# Patient Record
Sex: FEMALE | Race: WHITE | NOT HISPANIC OR LATINO | Employment: OTHER | URBAN - NONMETROPOLITAN AREA
[De-identification: names, ages, dates, MRNs, and addresses within clinical notes are randomized per-mention and may not be internally consistent; named-entity substitution may affect disease eponyms.]

---

## 2017-01-03 ENCOUNTER — TRANSCRIBE ORDERS (OUTPATIENT)
Dept: ADMINISTRATIVE | Facility: HOSPITAL | Age: 72
End: 2017-01-03

## 2017-01-03 DIAGNOSIS — R52 PAIN: Primary | ICD-10-CM

## 2017-01-05 ENCOUNTER — TRANSCRIBE ORDERS (OUTPATIENT)
Dept: ADMINISTRATIVE | Facility: HOSPITAL | Age: 72
End: 2017-01-05

## 2017-01-05 ENCOUNTER — HOSPITAL ENCOUNTER (OUTPATIENT)
Dept: ULTRASOUND IMAGING | Facility: HOSPITAL | Age: 72
Discharge: HOME | End: 2017-01-05
Admitting: INTERNAL MEDICINE

## 2017-01-05 DIAGNOSIS — I73.9 CLAUDICATION OF BOTH LOWER EXTREMITIES (HCC): Primary | ICD-10-CM

## 2017-01-05 DIAGNOSIS — I73.9 CLAUDICATION OF BOTH LOWER EXTREMITIES (HCC): ICD-10-CM

## 2017-01-05 DIAGNOSIS — R52 PAIN: ICD-10-CM

## 2017-01-05 PROCEDURE — 93922 UPR/L XTREMITY ART 2 LEVELS: CPT

## 2017-01-05 PROCEDURE — 93924 LWR XTR VASC STDY BILAT: CPT | Performed by: SURGERY

## 2017-06-08 ENCOUNTER — OFFICE VISIT (OUTPATIENT)
Dept: UROLOGY | Facility: CLINIC | Age: 72
End: 2017-06-08

## 2017-06-08 ENCOUNTER — HOSPITAL ENCOUNTER (OUTPATIENT)
Dept: GENERAL RADIOLOGY | Facility: HOSPITAL | Age: 72
Discharge: HOME | End: 2017-06-08
Attending: UROLOGY | Admitting: UROLOGY

## 2017-06-08 VITALS
TEMPERATURE: 98.5 F | BODY MASS INDEX: 21.6 KG/M2 | DIASTOLIC BLOOD PRESSURE: 64 MMHG | SYSTOLIC BLOOD PRESSURE: 118 MMHG | WEIGHT: 137.6 LBS | HEIGHT: 67 IN

## 2017-06-08 DIAGNOSIS — N28.89: Primary | ICD-10-CM

## 2017-06-08 DIAGNOSIS — R31.29 HEMATURIA, MICROSCOPIC: ICD-10-CM

## 2017-06-08 DIAGNOSIS — N20.0 KIDNEY STONE: ICD-10-CM

## 2017-06-08 DIAGNOSIS — N20.0 KIDNEY STONES: Primary | ICD-10-CM

## 2017-06-08 LAB
BILIRUB BLD-MCNC: NEGATIVE MG/DL
CLARITY, POC: CLEAR
COLOR UR: YELLOW
GLUCOSE UR STRIP-MCNC: NEGATIVE MG/DL
KETONES UR QL: NEGATIVE
LEUKOCYTE EST, POC: NEGATIVE
NITRITE UR-MCNC: NEGATIVE MG/ML
PH UR: 7 [PH] (ref 5–8)
PROT UR STRIP-MCNC: NEGATIVE MG/DL
RBC # UR STRIP: ABNORMAL /UL
SP GR UR: 1.01 (ref 1–1.03)
UROBILINOGEN UR QL: NORMAL

## 2017-06-08 PROCEDURE — 88112 CYTOPATH CELL ENHANCE TECH: CPT | Performed by: UROLOGY

## 2017-06-08 PROCEDURE — 81003 URINALYSIS AUTO W/O SCOPE: CPT | Performed by: UROLOGY

## 2017-06-08 PROCEDURE — 74000 HC ABDOMEN KUB: CPT

## 2017-06-08 PROCEDURE — 99213 OFFICE O/P EST LOW 20 MIN: CPT | Performed by: UROLOGY

## 2017-06-08 RX ORDER — HYDROCODONE BITARTRATE AND ACETAMINOPHEN 10; 325 MG/1; MG/1
TABLET ORAL
Refills: 0 | COMMUNITY
Start: 2017-06-03

## 2017-06-08 RX ORDER — IBUPROFEN 800 MG/1
TABLET ORAL
COMMUNITY
Start: 2017-03-16 | End: 2019-12-22

## 2017-06-08 RX ORDER — DIAZEPAM 5 MG/1
TABLET ORAL
Refills: 2 | COMMUNITY
Start: 2017-06-03

## 2017-06-08 RX ORDER — CITALOPRAM 20 MG/1
TABLET ORAL
COMMUNITY
Start: 2017-06-05

## 2017-06-08 RX ORDER — ESTRADIOL 1 MG/1
TABLET ORAL
COMMUNITY
Start: 2017-05-19 | End: 2021-10-21

## 2017-06-08 RX ORDER — LOSARTAN POTASSIUM AND HYDROCHLOROTHIAZIDE 25; 100 MG/1; MG/1
TABLET ORAL
Refills: 3 | COMMUNITY
Start: 2017-06-03 | End: 2021-10-21

## 2017-06-08 RX ORDER — TIZANIDINE 4 MG/1
TABLET ORAL
Refills: 2 | COMMUNITY
Start: 2017-03-28

## 2017-06-08 RX ORDER — DULOXETIN HYDROCHLORIDE 30 MG/1
CAPSULE, DELAYED RELEASE ORAL
Refills: 0 | COMMUNITY
Start: 2017-03-03 | End: 2021-10-21

## 2017-06-08 NOTE — PROGRESS NOTES
Subjective    Ms. Guerrero is 71 y.o. female    Chief Complaint: Hematuria/Nephrolithiasis    History of Present Illness     Recurrent Nephrolithiasis  Patient is her for recurrent nephrolithiasis.  Location of stone is left renal. Stones were found for workup of flank pain.  Pt is currently Asymptomatic.  Pt. Has had stone disease for 2year(s). Risk factors for stone disease include none identified. Previous management of stone disease was observation.  Stone analysis was Unknown.  Metabolic workup revealed unknown.  Current treatment regimen includes hydration.  Associated symptoms include none.      Hematuria  Patient complains of microscopic hematuria. Onset of hematuria was 1 year ago and was gradual in onset. There is a history of nephrolithiasis. There is not a history of urologic trauma. Other urologic symptoms include none. Patient admits to history of no risk factors for cancer. Patient denies history of  trauma. Prior workup has been UA, cystoscopy, CT urogram. Prior workup has revealed no etiology.    The following portions of the patient's history were reviewed and updated as appropriate: allergies, current medications, past family history, past medical history, past social history, past surgical history and problem list.    Review of Systems   Constitutional: Negative for chills and fever.   Gastrointestinal: Negative for abdominal pain, anal bleeding and blood in stool.   Genitourinary: Negative for flank pain and hematuria.         Current Outpatient Prescriptions:   •  BREO ELLIPTA 100-25 MCG/INH aerosol powder , INHALE 1 PUFF PO QD, Disp: , Rfl: 2  •  citalopram (CeleXA) 20 MG tablet, , Disp: , Rfl:   •  diazePAM (VALIUM) 5 MG tablet, TK 1 T PO BID, Disp: , Rfl: 2  •  DULoxetine (CYMBALTA) 30 MG capsule, TK 1 C PO QD, Disp: , Rfl: 0  •  estradiol (ESTRACE) 1 MG tablet, , Disp: , Rfl:   •  HYDROcodone-acetaminophen (NORCO)  MG per tablet, TK 1 T PO TID, Disp: , Rfl: 0  •  ibuprofen  "(ADVIL,MOTRIN) 800 MG tablet, , Disp: , Rfl:   •  losartan-hydrochlorothiazide (HYZAAR) 100-25 MG per tablet, TK 1 T PO QD, Disp: , Rfl: 3  •  tiZANidine (ZANAFLEX) 4 MG tablet, TK 1 T PO BID, Disp: , Rfl: 2    Past Medical History:   Diagnosis Date   • Depression    • Hyperlipidemia    • Hypertension    • Kidney stone        Past Surgical History:   Procedure Laterality Date   • BLADDER SURGERY     • CHOLECYSTECTOMY     • HYSTERECTOMY         Social History     Social History   • Marital status:      Spouse name: N/A   • Number of children: N/A   • Years of education: N/A     Social History Main Topics   • Smoking status: Never Smoker   • Smokeless tobacco: None   • Alcohol use No   • Drug use: None   • Sexual activity: Not Asked     Other Topics Concern   • None     Social History Narrative   • None       Family History   Problem Relation Age of Onset   • No Known Problems Father    • No Known Problems Mother        Objective    /64  Temp 98.5 °F (36.9 °C)  Ht 67\" (170.2 cm)  Wt 137 lb 9.6 oz (62.4 kg)  BMI 21.55 kg/m2    Physical Exam   Constitutional: She is oriented to person, place, and time. She appears well-developed and well-nourished. No distress.   Pulmonary/Chest: Effort normal.   Abdominal: Soft. She exhibits no distension and no mass. There is no tenderness. There is no rebound and no guarding. No hernia.   Neurological: She is alert and oriented to person, place, and time.   Skin: Skin is warm and dry. She is not diaphoretic.   Psychiatric: She has a normal mood and affect.   Vitals reviewed.          Results for orders placed or performed in visit on 06/08/17   POC Urinalysis Dipstick, Automated   Result Value Ref Range    Color Yellow Yellow, Straw, Dark Yellow, Samira    Clarity, UA Clear Clear    Glucose, UA Negative Negative, 1000 mg/dL (3+) mg/dL    Bilirubin Negative Negative    Ketones, UA Negative Negative    Specific Gravity  1.015 1.005 - 1.030    Blood, UA Trace (A) " Negative    pH, Urine 7.0 5.0 - 8.0    Protein, POC Negative Negative mg/dL    Urobilinogen, UA Normal Normal    Leukocytes Negative Negative    Nitrite, UA Negative Negative   KUB independent review    A KUB is available for me to review today.  The image is inspected for a bowel gas pattern and the general bone structure of the spine and pelvis. The kidneys are then inspected closely.  Renal outline is noted if identifiable. The kidney, collecting system, and anticipated path of the ureter are examined for calcifications including those in the true pelvis.  This film reveals:    On the right there are no calcificaitons seen in the kidney or the expected course of the ureter. .    On the left there are no calcificaitons seen in the kidney or the expected course of the ureter. .      Assessment and Plan    Diagnoses and all orders for this visit:    Congenital calyceal diverticulum  -     POC Urinalysis Dipstick, Automated    Hematuria, microscopic  -     Non-gynecologic Cytology    Kidney stone    Patient has a negative hematuria workup in 2016.  She does have a calyceal diverticulum left there was a stone noted on in her diverticulum.  This is not visible today on KUB.

## 2017-06-12 LAB
CYTO UR: NORMAL
LAB AP CASE REPORT: NORMAL
Lab: NORMAL
PATH REPORT.FINAL DX SPEC: NORMAL
PATH REPORT.GROSS SPEC: NORMAL

## 2017-07-07 ENCOUNTER — HOSPITAL ENCOUNTER (OUTPATIENT)
Dept: CT IMAGING | Age: 72
Discharge: HOME | End: 2017-07-07
Payer: MEDICARE

## 2017-07-07 DIAGNOSIS — M54.16 LUMBAR RADICULOPATHY: ICD-10-CM

## 2017-07-07 PROCEDURE — 72131 CT LUMBAR SPINE W/O DYE: CPT

## 2018-06-08 ENCOUNTER — TELEPHONE (OUTPATIENT)
Dept: UROLOGY | Facility: CLINIC | Age: 73
End: 2018-06-08

## 2018-06-26 NOTE — PROGRESS NOTES
Subjective    Ms. Guerrero is 73 y.o. female    Chief Complaint: Microscopic Hematuria/Kidney Stones    History of Present Illness    Recurrent Nephrolithiasis  Patient is her for recurrent nephrolithiasis.  Location of stone is left renal. Stones were found for workup of flank pain.  Pt is currently Asymptomatic.  Pt. Has had stone disease for 2year(s). Risk factors for stone disease include none identified. Previous management of stone disease was observation.  Stone analysis was Unknown.  Metabolic workup revealed unknown.  Current treatment regimen includes hydration.  Associated symptoms include none.       Hematuria  Patient complains of microscopic hematuria. Onset of hematuria was 1 year ago and was gradual in onset. There is a history of nephrolithiasis. There is not a history of urologic trauma. Other urologic symptoms include none. Patient admits to history of no risk factors for cancer. Patient denies history of  trauma. Prior workup has been UA, cystoscopy, CT urogram. Prior workup has revealed no etiology.    The following portions of the patient's history were reviewed and updated as appropriate: allergies, current medications, past family history, past medical history, past social history, past surgical history and problem list.    Review of Systems   Constitutional: Negative for chills and fever.   Gastrointestinal: Negative for abdominal pain, anal bleeding and blood in stool.   Genitourinary: Positive for flank pain (Bilateral L>R) and frequency. Negative for difficulty urinating, hematuria and urgency.         Current Outpatient Prescriptions:   •  BREO ELLIPTA 100-25 MCG/INH aerosol powder , INHALE 1 PUFF PO QD, Disp: , Rfl: 2  •  citalopram (CeleXA) 20 MG tablet, , Disp: , Rfl:   •  diazePAM (VALIUM) 5 MG tablet, TK 1 T PO BID, Disp: , Rfl: 2  •  DULoxetine (CYMBALTA) 30 MG capsule, TK 1 C PO QD, Disp: , Rfl: 0  •  estradiol (ESTRACE) 1 MG tablet, , Disp: , Rfl:   •  HYDROcodone-acetaminophen  "(NORCO)  MG per tablet, TK 1 T PO TID, Disp: , Rfl: 0  •  ibuprofen (ADVIL,MOTRIN) 800 MG tablet, , Disp: , Rfl:   •  losartan-hydrochlorothiazide (HYZAAR) 100-25 MG per tablet, TK 1 T PO QD, Disp: , Rfl: 3  •  potassium chloride (K-DUR) 10 MEQ CR tablet, Take 10 mEq by mouth Daily., Disp: , Rfl: 2  •  rosuvastatin (CRESTOR) 20 MG tablet, Take  by mouth Daily., Disp: , Rfl: 2  •  tiZANidine (ZANAFLEX) 4 MG tablet, TK 1 T PO BID, Disp: , Rfl: 2    Past Medical History:   Diagnosis Date   • Depression    • Hyperlipidemia    • Hypertension    • Kidney stone        Past Surgical History:   Procedure Laterality Date   • BLADDER SURGERY     • CHOLECYSTECTOMY     • HYSTERECTOMY     • INCONTINENCE SURGERY      x2  Dr Lindsay       Social History     Social History   • Marital status:      Social History Main Topics   • Smoking status: Never Smoker   • Smokeless tobacco: Never Used   • Alcohol use No   • Drug use: No   • Sexual activity: Defer     Other Topics Concern   • Not on file       Family History   Problem Relation Age of Onset   • No Known Problems Father    • No Known Problems Mother        Objective    Ht 170.2 cm (67\")   Wt 61.4 kg (135 lb 6.4 oz)   BMI 21.21 kg/m²     Physical Exam   Constitutional: She is oriented to person, place, and time. She appears well-developed and well-nourished. No distress.   Pulmonary/Chest: Effort normal.   Abdominal: Soft. She exhibits no distension and no mass. There is no tenderness. There is no rebound and no guarding. No hernia.   Neurological: She is alert and oriented to person, place, and time.   Skin: Skin is warm and dry. She is not diaphoretic.   Psychiatric: She has a normal mood and affect.   Vitals reviewed.      KUB independent review    A KUB is available for me to review today.  The image is inspected for a bowel gas pattern and the general bone structure of the spine and pelvis. The kidneys are then inspected closely.  Renal outline is noted if " identifiable. The kidney, collecting system, and anticipated path of the ureter are examined for calcifications including those in the true pelvis.  This film reveals:    On the right there are no calcificaitons seen in the kidney or the expected course of the ureter. .    On the left there are no calcificaitons seen in the kidney or the expected course of the ureter. .        Results for orders placed or performed in visit on 06/27/18   POC Urinalysis Dipstick, Multipro   Result Value Ref Range    Color Yellow Yellow, Straw, Dark Yellow, Samira    Clarity, UA Clear Clear    Glucose, UA Negative Negative, 1000 mg/dL (3+) mg/dL    Bilirubin Negative Negative    Ketones, UA Negative Negative    Specific Gravity  1.030 1.005 - 1.030    Blood, UA Negative Negative    pH, Urine 6.5 5.0 - 8.0    Protein, POC Trace (A) Negative mg/dL    Urobilinogen, UA Normal Normal    Nitrite, UA Negative Negative    Leukocytes Negative Negative     Assessment and Plan    Diagnoses and all orders for this visit:    Kidney stones  -     XR Abdomen KUB    Hematuria, microscopic  -     POC Urinalysis Dipstick, Multipro          Patient has a negative hematuria workup in 2016.  She does have a calyceal diverticulum left there was a stone noted on in her diverticulum.  This is not visible today on KUB.  Her urine today is negative.  She will follow-up on a when necessary basis.

## 2018-06-27 ENCOUNTER — HOSPITAL ENCOUNTER (OUTPATIENT)
Dept: GENERAL RADIOLOGY | Facility: HOSPITAL | Age: 73
Discharge: HOME | End: 2018-06-27
Attending: UROLOGY | Admitting: UROLOGY

## 2018-06-27 ENCOUNTER — OFFICE VISIT (OUTPATIENT)
Dept: UROLOGY | Facility: CLINIC | Age: 73
End: 2018-06-27

## 2018-06-27 VITALS — WEIGHT: 135.4 LBS | BODY MASS INDEX: 21.25 KG/M2 | HEIGHT: 67 IN

## 2018-06-27 DIAGNOSIS — R31.29 HEMATURIA, MICROSCOPIC: ICD-10-CM

## 2018-06-27 DIAGNOSIS — N20.0 KIDNEY STONES: Primary | ICD-10-CM

## 2018-06-27 LAB
BILIRUB BLD-MCNC: NEGATIVE MG/DL
CLARITY, POC: CLEAR
COLOR UR: YELLOW
GLUCOSE UR STRIP-MCNC: NEGATIVE MG/DL
KETONES UR QL: NEGATIVE
LEUKOCYTE EST, POC: NEGATIVE
NITRITE UR-MCNC: NEGATIVE MG/ML
PH UR: 6.5 [PH] (ref 5–8)
PROT UR STRIP-MCNC: ABNORMAL MG/DL
RBC # UR STRIP: NEGATIVE /UL
SP GR UR: 1.03 (ref 1–1.03)
UROBILINOGEN UR QL: NORMAL

## 2018-06-27 PROCEDURE — 81001 URINALYSIS AUTO W/SCOPE: CPT | Performed by: UROLOGY

## 2018-06-27 PROCEDURE — 74018 RADEX ABDOMEN 1 VIEW: CPT

## 2018-06-27 PROCEDURE — 99213 OFFICE O/P EST LOW 20 MIN: CPT | Performed by: UROLOGY

## 2018-06-27 RX ORDER — POTASSIUM CHLORIDE 750 MG/1
10 TABLET, FILM COATED, EXTENDED RELEASE ORAL DAILY
Refills: 2 | COMMUNITY
Start: 2018-03-25 | End: 2021-10-21

## 2018-06-27 RX ORDER — ROSUVASTATIN CALCIUM 20 MG/1
TABLET, COATED ORAL DAILY
Refills: 2 | COMMUNITY
Start: 2018-03-25

## 2019-12-22 ENCOUNTER — APPOINTMENT (OUTPATIENT)
Dept: GENERAL RADIOLOGY | Facility: HOSPITAL | Age: 74
End: 2019-12-22

## 2019-12-22 ENCOUNTER — HOSPITAL ENCOUNTER (EMERGENCY)
Facility: HOSPITAL | Age: 74
Discharge: HOME | End: 2019-12-22
Admitting: EMERGENCY MEDICINE

## 2019-12-22 VITALS
OXYGEN SATURATION: 99 % | HEART RATE: 70 BPM | RESPIRATION RATE: 18 BRPM | SYSTOLIC BLOOD PRESSURE: 152 MMHG | DIASTOLIC BLOOD PRESSURE: 70 MMHG | HEIGHT: 67 IN | TEMPERATURE: 98 F | BODY MASS INDEX: 21.19 KG/M2 | WEIGHT: 135 LBS

## 2019-12-22 DIAGNOSIS — S93.401A SPRAIN OF RIGHT ANKLE, UNSPECIFIED LIGAMENT, INITIAL ENCOUNTER: Primary | ICD-10-CM

## 2019-12-22 DIAGNOSIS — M25.562 ACUTE PAIN OF LEFT KNEE: ICD-10-CM

## 2019-12-22 PROCEDURE — 73562 X-RAY EXAM OF KNEE 3: CPT

## 2019-12-22 PROCEDURE — 99283 EMERGENCY DEPT VISIT LOW MDM: CPT

## 2019-12-22 PROCEDURE — 73610 X-RAY EXAM OF ANKLE: CPT

## 2019-12-22 RX ORDER — HYDROCODONE BITARTRATE AND ACETAMINOPHEN 7.5; 325 MG/1; MG/1
1 TABLET ORAL ONCE
Status: COMPLETED | OUTPATIENT
Start: 2019-12-22 | End: 2019-12-22

## 2019-12-22 RX ORDER — NAPROXEN 500 MG/1
500 TABLET ORAL 2 TIMES DAILY WITH MEALS
Qty: 20 TABLET | Refills: 0 | Status: SHIPPED | OUTPATIENT
Start: 2019-12-22 | End: 2020-01-01

## 2019-12-22 RX ADMIN — HYDROCODONE BITARTRATE AND ACETAMINOPHEN 1 TABLET: 7.5; 325 TABLET ORAL at 20:33

## 2019-12-23 NOTE — DISCHARGE INSTRUCTIONS
Increase fluids.  Medication as ordered.  Rest, Ice, Elevate.  Walking boot when up.  Follow up with PCP tomorrow - call for appointment. Return to ED if condition does not improve or worsens

## 2019-12-23 NOTE — ED PROVIDER NOTES
Subjective   74 yof c/o right ankle and left knee pain.  She states she stepped on the board and it broke causing her to fall. She denies head injury or LOC.  She states she 'believes her knee is okay but her ankle hurts.'          Review of Systems   Constitutional: Negative for activity change, appetite change, fatigue and fever.   HENT: Negative for congestion, ear pain, facial swelling and sore throat.    Eyes: Negative for discharge and visual disturbance.   Respiratory: Negative for apnea, chest tightness, shortness of breath, wheezing and stridor.    Cardiovascular: Negative for chest pain and palpitations.   Gastrointestinal: Negative for abdominal distention, abdominal pain, diarrhea, nausea and vomiting.   Genitourinary: Negative for difficulty urinating and dysuria.   Musculoskeletal: Negative for arthralgias and myalgias.   Skin: Negative for rash and wound.   Neurological: Negative for dizziness and seizures.   Psychiatric/Behavioral: Negative for agitation and confusion.       Past Medical History:   Diagnosis Date   • Depression    • Hyperlipidemia    • Hypertension    • Kidney stone        No Known Allergies    Past Surgical History:   Procedure Laterality Date   • BLADDER SURGERY     • CHOLECYSTECTOMY     • HYSTERECTOMY     • INCONTINENCE SURGERY      x2  Dr Lindsay       Family History   Problem Relation Age of Onset   • No Known Problems Father    • No Known Problems Mother        Social History     Socioeconomic History   • Marital status:      Spouse name: Not on file   • Number of children: Not on file   • Years of education: Not on file   • Highest education level: Not on file   Tobacco Use   • Smoking status: Never Smoker   • Smokeless tobacco: Never Used   Substance and Sexual Activity   • Alcohol use: No   • Drug use: No   • Sexual activity: Defer           Objective   Physical Exam   Constitutional: She is oriented to person, place, and time. She appears well-developed.   HENT:    Head: Normocephalic.   Eyes: Pupils are equal, round, and reactive to light. EOM are normal.   Neck: Normal range of motion. Neck supple.   Cardiovascular: Normal rate and regular rhythm.   No murmur heard.  Pulmonary/Chest: Effort normal and breath sounds normal.   Abdominal: Soft. Bowel sounds are normal.   Musculoskeletal:        Left knee: She exhibits normal range of motion, no swelling, no effusion, no deformity, no laceration, no erythema and no bony tenderness. No tenderness found. No medial joint line, no lateral joint line, no MCL and no LCL tenderness noted.        Right ankle: She exhibits decreased range of motion, swelling and ecchymosis. She exhibits no deformity, no laceration and normal pulse. Tenderness. Lateral malleolus tenderness found. No medial malleolus tenderness found. Achilles tendon exhibits no pain, no defect and normal Allan's test results.        Neurological: She is alert and oriented to person, place, and time.   Skin: Skin is warm and dry.   Psychiatric: She has a normal mood and affect.       Splint - Cast - Strapping  Date/Time: 12/22/2019 8:45 PM  Performed by: Mariya Barry APRN  Authorized by: Mariya Barry APRN     Consent:     Consent obtained:  Verbal    Consent given by:  Patient    Risks discussed:  Discoloration, numbness, pain and swelling    Alternatives discussed:  No treatment  Pre-procedure details:     Sensation:  Normal    Skin color:  Pink  Procedure details:     Laterality:  Right    Location:  Ankle    Ankle:  R ankle    Strapping: no      Splint type:  CAM walker boot  Post-procedure details:     Pain:  Improved    Sensation:  Normal    Skin color:  Pink    Patient tolerance of procedure:  Tolerated well, no immediate complications  Comments:      Placed by PCT              No current facility-administered medications for this encounter.     Current Outpatient Medications:   •  BREO ELLIPTA 100-25 MCG/INH aerosol powder , INHALE 1  "PUFF PO QD, Disp: , Rfl: 2  •  citalopram (CeleXA) 20 MG tablet, , Disp: , Rfl:   •  diazePAM (VALIUM) 5 MG tablet, TK 1 T PO BID, Disp: , Rfl: 2  •  DULoxetine (CYMBALTA) 30 MG capsule, TK 1 C PO QD, Disp: , Rfl: 0  •  estradiol (ESTRACE) 1 MG tablet, , Disp: , Rfl:   •  HYDROcodone-acetaminophen (NORCO)  MG per tablet, TK 1 T PO TID, Disp: , Rfl: 0  •  losartan-hydrochlorothiazide (HYZAAR) 100-25 MG per tablet, TK 1 T PO QD, Disp: , Rfl: 3  •  naproxen (NAPROSYN) 500 MG tablet, Take 1 tablet by mouth 2 (Two) Times a Day With Meals for 10 days., Disp: 20 tablet, Rfl: 0  •  potassium chloride (K-DUR) 10 MEQ CR tablet, Take 10 mEq by mouth Daily., Disp: , Rfl: 2  •  rosuvastatin (CRESTOR) 20 MG tablet, Take  by mouth Daily., Disp: , Rfl: 2  •  tiZANidine (ZANAFLEX) 4 MG tablet, TK 1 T PO BID, Disp: , Rfl: 2    Vital signs:  /70 (BP Location: Right arm, Patient Position: Sitting)   Pulse 70   Temp 98 °F (36.7 °C) (Oral)   Resp 18   Ht 170.2 cm (67\")   Wt 61.2 kg (135 lb)   SpO2 99%   BMI 21.14 kg/m²        ED LAB RESULTS:   Lab Results (last 24 hours)     ** No results found for the last 24 hours. **             IMAGING RESULTS  XR Knee 3 View Left   ED Interpretation   See results below      Final Result   Addendum 1 of 1   Addendum:       XR KNEE 3 VW LEFT-        12/22/2019 7:20 PM CST       Dictation error:       The distal femur and the proximal tibia and fibula are intact.        Normal tibiofemoral and patellofemoral joint spaces.       No significant joint effusion.       Summary:   1. No acute bony abnormality.                       This report was finalized on 12/22/2019 20:03 by Dr. Surinder Lovell MD.      Final      XR Ankle 3+ View Right   ED Interpretation   See results below      Final Result                     ED Course  ED Course as of Dec 22 2131   Sun Dec 22, 2019   2055 Xray reviewed by Dr Shaw and he states it is chronic which is in agreement with radiology.    [KS]      ED " Course User Index  [KS] Mariya Barry, APRN                      No data recorded                        MDM  Number of Diagnoses or Management Options  Acute pain of left knee: minor  Sprain of right ankle, unspecified ligament, initial encounter: minor     Amount and/or Complexity of Data Reviewed  Tests in the radiology section of CPT®: ordered and reviewed  Discuss the patient with other providers: yes  Independent visualization of images, tracings, or specimens: yes    Risk of Complications, Morbidity, and/or Mortality  Presenting problems: minimal  Diagnostic procedures: minimal  Management options: minimal    Patient Progress  Patient progress: improved      Final diagnoses:   Sprain of right ankle, unspecified ligament, initial encounter   Acute pain of left knee              Praneeths, Mariya Estrada, APRN  12/22/19 [id number]

## 2020-06-04 ENCOUNTER — TRANSCRIBE ORDERS (OUTPATIENT)
Dept: ADMINISTRATIVE | Facility: HOSPITAL | Age: 75
End: 2020-06-04

## 2020-06-04 DIAGNOSIS — Z01.818 PREOP TESTING: Primary | ICD-10-CM

## 2020-06-12 ENCOUNTER — LAB (OUTPATIENT)
Dept: LAB | Facility: HOSPITAL | Age: 75
End: 2020-06-12

## 2020-06-12 PROCEDURE — U0003 INFECTIOUS AGENT DETECTION BY NUCLEIC ACID (DNA OR RNA); SEVERE ACUTE RESPIRATORY SYNDROME CORONAVIRUS 2 (SARS-COV-2) (CORONAVIRUS DISEASE [COVID-19]), AMPLIFIED PROBE TECHNIQUE, MAKING USE OF HIGH THROUGHPUT TECHNOLOGIES AS DESCRIBED BY CMS-2020-01-R: HCPCS | Performed by: PAIN MEDICINE

## 2020-06-13 LAB
COVID LABCORP PRIORITY: NORMAL
SARS-COV-2 RNA RESP QL NAA+PROBE: NOT DETECTED

## 2020-09-14 ENCOUNTER — TRANSCRIBE ORDERS (OUTPATIENT)
Dept: ADMINISTRATIVE | Facility: HOSPITAL | Age: 75
End: 2020-09-14

## 2020-09-14 ENCOUNTER — HOSPITAL ENCOUNTER (EMERGENCY)
Facility: HOSPITAL | Age: 75
End: 2020-09-14

## 2020-09-14 DIAGNOSIS — Z01.818 PREOP TESTING: Primary | ICD-10-CM

## 2020-09-15 ENCOUNTER — LAB (OUTPATIENT)
Dept: LAB | Facility: HOSPITAL | Age: 75
End: 2020-09-15

## 2020-09-15 PROCEDURE — U0003 INFECTIOUS AGENT DETECTION BY NUCLEIC ACID (DNA OR RNA); SEVERE ACUTE RESPIRATORY SYNDROME CORONAVIRUS 2 (SARS-COV-2) (CORONAVIRUS DISEASE [COVID-19]), AMPLIFIED PROBE TECHNIQUE, MAKING USE OF HIGH THROUGHPUT TECHNOLOGIES AS DESCRIBED BY CMS-2020-01-R: HCPCS | Performed by: PAIN MEDICINE

## 2020-09-15 PROCEDURE — C9803 HOPD COVID-19 SPEC COLLECT: HCPCS | Performed by: PAIN MEDICINE

## 2020-09-16 LAB
COVID LABCORP PRIORITY: NORMAL
SARS-COV-2 RNA RESP QL NAA+PROBE: NOT DETECTED

## 2020-12-16 ENCOUNTER — TRANSCRIBE ORDERS (OUTPATIENT)
Dept: ADMINISTRATIVE | Facility: HOSPITAL | Age: 75
End: 2020-12-16

## 2020-12-16 DIAGNOSIS — Z01.818 PREOP TESTING: Primary | ICD-10-CM

## 2020-12-19 ENCOUNTER — LAB (OUTPATIENT)
Dept: LAB | Facility: HOSPITAL | Age: 75
End: 2020-12-19

## 2020-12-19 PROCEDURE — U0003 INFECTIOUS AGENT DETECTION BY NUCLEIC ACID (DNA OR RNA); SEVERE ACUTE RESPIRATORY SYNDROME CORONAVIRUS 2 (SARS-COV-2) (CORONAVIRUS DISEASE [COVID-19]), AMPLIFIED PROBE TECHNIQUE, MAKING USE OF HIGH THROUGHPUT TECHNOLOGIES AS DESCRIBED BY CMS-2020-01-R: HCPCS | Performed by: PAIN MEDICINE

## 2020-12-19 PROCEDURE — C9803 HOPD COVID-19 SPEC COLLECT: HCPCS | Performed by: PAIN MEDICINE

## 2020-12-20 LAB
COVID LABCORP PRIORITY: NORMAL
SARS-COV-2 RNA RESP QL NAA+PROBE: NOT DETECTED

## 2021-03-18 ENCOUNTER — TRANSCRIBE ORDERS (OUTPATIENT)
Dept: LAB | Facility: HOSPITAL | Age: 76
End: 2021-03-18

## 2021-03-18 DIAGNOSIS — Z01.818 PREOP TESTING: Primary | ICD-10-CM

## 2021-03-20 ENCOUNTER — LAB (OUTPATIENT)
Dept: LAB | Facility: HOSPITAL | Age: 76
End: 2021-03-20

## 2021-03-20 LAB — SARS-COV-2 ORF1AB RESP QL NAA+PROBE: NOT DETECTED

## 2021-03-20 PROCEDURE — C9803 HOPD COVID-19 SPEC COLLECT: HCPCS | Performed by: PAIN MEDICINE

## 2021-03-20 PROCEDURE — U0004 COV-19 TEST NON-CDC HGH THRU: HCPCS | Performed by: PAIN MEDICINE

## 2021-03-20 PROCEDURE — U0005 INFEC AGEN DETEC AMPLI PROBE: HCPCS | Performed by: PAIN MEDICINE

## 2021-06-24 ENCOUNTER — TRANSCRIBE ORDERS (OUTPATIENT)
Dept: LAB | Facility: HOSPITAL | Age: 76
End: 2021-06-24

## 2021-10-20 ENCOUNTER — TELEPHONE (OUTPATIENT)
Dept: VASCULAR SURGERY | Facility: CLINIC | Age: 76
End: 2021-10-20

## 2021-10-20 NOTE — TELEPHONE ENCOUNTER
Spoke with Ms Guerrero reminding her of her appointment for Thursday, October 21st, 2021 at 845 am with Dr Moon. Ms Guerrero confirmed she would be here.

## 2021-10-21 ENCOUNTER — OFFICE VISIT (OUTPATIENT)
Dept: VASCULAR SURGERY | Facility: CLINIC | Age: 76
End: 2021-10-21

## 2021-10-21 ENCOUNTER — PATIENT ROUNDING (BHMG ONLY) (OUTPATIENT)
Dept: VASCULAR SURGERY | Facility: CLINIC | Age: 76
End: 2021-10-21

## 2021-10-21 VITALS
BODY MASS INDEX: 22.6 KG/M2 | HEART RATE: 56 BPM | OXYGEN SATURATION: 96 % | SYSTOLIC BLOOD PRESSURE: 130 MMHG | WEIGHT: 144 LBS | HEIGHT: 67 IN | DIASTOLIC BLOOD PRESSURE: 74 MMHG

## 2021-10-21 DIAGNOSIS — I10 PRIMARY HYPERTENSION: ICD-10-CM

## 2021-10-21 DIAGNOSIS — I77.9 VERTEBRAL ARTERY DISEASE (HCC): ICD-10-CM

## 2021-10-21 DIAGNOSIS — I65.23 BILATERAL CAROTID ARTERY STENOSIS: Primary | ICD-10-CM

## 2021-10-21 DIAGNOSIS — E78.5 HYPERLIPIDEMIA, UNSPECIFIED HYPERLIPIDEMIA TYPE: ICD-10-CM

## 2021-10-21 PROCEDURE — 99204 OFFICE O/P NEW MOD 45 MIN: CPT | Performed by: SURGERY

## 2021-10-21 RX ORDER — LOSARTAN POTASSIUM 50 MG/1
50 TABLET ORAL DAILY
COMMUNITY
End: 2022-01-03 | Stop reason: SDUPTHER

## 2021-10-21 RX ORDER — HYDROCHLOROTHIAZIDE 12.5 MG/1
12.5 CAPSULE, GELATIN COATED ORAL DAILY
COMMUNITY
End: 2022-08-29

## 2021-10-21 RX ORDER — MECLIZINE HYDROCHLORIDE 25 MG/1
25 TABLET ORAL 3 TIMES DAILY PRN
COMMUNITY

## 2021-10-21 RX ORDER — CLOPIDOGREL BISULFATE 75 MG/1
75 TABLET ORAL DAILY
COMMUNITY

## 2021-10-21 RX ORDER — PANTOPRAZOLE SODIUM 40 MG/1
40 TABLET, DELAYED RELEASE ORAL DAILY
COMMUNITY
End: 2023-03-28 | Stop reason: SDUPTHER

## 2021-10-21 RX ORDER — CARVEDILOL 3.12 MG/1
3.12 TABLET ORAL 2 TIMES DAILY WITH MEALS
COMMUNITY

## 2021-10-21 NOTE — PROGRESS NOTES
Subjective    Ms. Guerrero is 76 y.o. female    Chief Complaint: Urinary incontinence.    History of Present Illness  Patient is a 76-year-old female who is referred to us with a new problem of urinary incontinence.  This has occurred for the last few years.  She reports some urgency and urge incontinence during the day her urge incontinence episodes are infrequent at night she wakes up at least once a night when she is already leaked urine.  She is not on any urologic medications.  She does have previous history of kidney stones and also has history of renal cysts one of the cyst on ultrasound done in June 2021 revealed findings consistent with possible angiomyolipoma.  She has seen Dr. Shah last seen June 2018.      The following portions of the patient's history were reviewed and updated as appropriate: allergies, current medications, past family history, past medical history, past social history, past surgical history and problem list.    Review of Systems      Current Outpatient Medications:   •  BREO ELLIPTA 100-25 MCG/INH aerosol powder , INHALE 1 PUFF PO QD, Disp: , Rfl: 2  •  carvedilol (COREG) 3.125 MG tablet, Take 3.125 mg by mouth 2 (Two) Times a Day With Meals., Disp: , Rfl:   •  citalopram (CeleXA) 20 MG tablet, , Disp: , Rfl:   •  clopidogrel (PLAVIX) 75 MG tablet, Take 75 mg by mouth Daily., Disp: , Rfl:   •  diazePAM (VALIUM) 5 MG tablet, TK 1 T PO BID, Disp: , Rfl: 2  •  hydroCHLOROthiazide (MICROZIDE) 12.5 MG capsule, Take 12.5 mg by mouth Daily., Disp: , Rfl:   •  HYDROcodone-acetaminophen (NORCO)  MG per tablet, TK 1 T PO TID, Disp: , Rfl: 0  •  losartan (COZAAR) 50 MG tablet, Take 50 mg by mouth Daily., Disp: , Rfl:   •  meclizine (ANTIVERT) 25 MG tablet, Take 25 mg by mouth 3 (Three) Times a Day As Needed for Dizziness., Disp: , Rfl:   •  pantoprazole (PROTONIX) 40 MG EC tablet, Take 40 mg by mouth Daily., Disp: , Rfl:   •  rosuvastatin (CRESTOR) 20 MG tablet, Take  by mouth Daily., Disp:  ", Rfl: 2  •  tiZANidine (ZANAFLEX) 4 MG tablet, TK 1 T PO BID, Disp: , Rfl: 2  •  Mirabegron ER (Myrbetriq) 25 MG tablet sustained-release 24 hour 24 hr tablet, Take 1 tablet by mouth Daily for 28 days., Disp: 28 tablet, Rfl: 0    Past Medical History:   Diagnosis Date   • Arthritis    • Asthma    • Cancer (HCC)     Female    • Depression    • Hearing loss    • Heart attack (HCC)    • Heart disease    • High blood pressure    • Hyperlipidemia    • Hypertension    • Hypertension    • Kidney stone    • Stroke (HCC)        Past Surgical History:   Procedure Laterality Date   • BACK SURGERY      40 Years Ago x2    • BLADDER SURGERY     • CARDIAC SURGERY      3 years ago in [facility]    • CHOLECYSTECTOMY     • HYSTERECTOMY     • INCONTINENCE SURGERY      x2  Dr Lindsay   • NECK SURGERY      30 years ago [facility]        Social History     Socioeconomic History   • Marital status:    Tobacco Use   • Smoking status: Never Smoker   • Smokeless tobacco: Never Used   Vaping Use   • Vaping Use: Never used   Substance and Sexual Activity   • Alcohol use: No   • Drug use: No   • Sexual activity: Defer       Family History   Problem Relation Age of Onset   • COPD Father    • Lung disease Father    • Heart disease Father    • Heart disease Mother    • Cancer Mother    • Stroke Mother    • Heart disease Sister    • Breast cancer Sister    • Diabetes Brother    • COPD Son    • Lung cancer Son    • Cancer Daughter    • Hepatitis Daughter        Objective    Temp 98.7 °F (37.1 °C)   Ht 170.2 cm (67\")   Wt 66 kg (145 lb 9.6 oz)   BMI 22.80 kg/m²     Physical Exam  Vitals reviewed.   Constitutional:       Appearance: Normal appearance.   HENT:      Head: Normocephalic and atraumatic.      Right Ear: External ear normal.      Left Ear: External ear normal.      Nose: No congestion.   Pulmonary:      Effort: Pulmonary effort is normal.   Abdominal:      Palpations: Abdomen is soft.      Tenderness: There " is no right CVA tenderness or left CVA tenderness.   Skin:     General: Skin is warm and dry.   Neurological:      General: No focal deficit present.      Mental Status: She is alert and oriented to person, place, and time.   Psychiatric:         Mood and Affect: Mood normal.         Behavior: Behavior normal.             Results for orders placed or performed in visit on 10/25/21   POC Urinalysis Dipstick, Multipro    Specimen: Urine   Result Value Ref Range    Color Yellow Yellow, Straw, Dark Yellow, Samira    Clarity, UA Clear Clear    Glucose, UA Negative Negative, 1000 mg/dL (3+) mg/dL    Bilirubin Negative Negative    Ketones, UA Negative Negative    Specific Gravity  1.020 1.005 - 1.030    Blood, UA Negative Negative    pH, Urine 7.0 5.0 - 8.0    Protein, POC Negative Negative mg/dL    Urobilinogen, UA Normal Normal    Nitrite, UA Negative Negative    Leukocytes Negative Negative   Bladder Scan interpretation  Estimation of residual urine via abdominal ultrasound  Residual Urine: 0 ml  Indication: Incontinence  Position: Supine  Examination: Incremental scanning of the suprapubic area using 3 MHz transducer using copious amounts of acoustic gel.   Findings: An anechoic area was demonstrated which represented the bladder, with measurement of residual urine as noted. I inspected this myself. In that the residual urine was stable or insignificant, no treatment will be necessary at this time.     Assessment and Plan    Diagnoses and all orders for this visit:    1. Urinary incontinence, unspecified type (Primary)  -     POC Urinalysis Dipstick, Multipro  -     CT Abdomen Pelvis With & Without Contrast; Future    2. History of kidney stones  -     CT Abdomen Pelvis With & Without Contrast; Future    3. Left renal mass  -     CT Abdomen Pelvis With & Without Contrast; Future    Patient is a 76-year-old female with gradually worsening incontinence over the past few years she has never been on any urologic medications  in the past.  She will have some episodes of urge incontinence during the day but also having incontinence at nighttime.  Her bladder scan was 0 postvoid residual.  I will start her on samples of Myrbetriq 25 mg.  Also I may refer her for pelvic floor rehab at physical therapy but will wait until I see her back in 1 month.    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  Would like her to get a CT urogram which will evaluate her for drainage of ureters along with any abnormalities of the kidneys bladder or ureters.

## 2021-10-21 NOTE — PROGRESS NOTES
10/21/2021      Coleman Zapata MD  [address] [place] [address]    Felipa Guerrero  1945    Chief Complaint   Patient presents with   • Establish Care     Referred over by Dr Zapata for Occlusion and Stenosis of Unspecified Vertebral Arteries. Test [id number] in Media CTA of Neck at [facility]. Patient denies any stroke like symptoms.    • Non Smoker     Patient is a Non Smoker    • other     patient granddaughter states she has had mini strokes prior and when you can see her face and she does have some facial droop   • Med Management     Verified medications from list patient/granddaughter brought in        Dear Coleman Zapata MD    HPI  I had the pleasure of seeing your patient Felipa Guerrero in the office today.  Thank you kindly for this consultation.  As you recall, Felipa Guerrero is a 76 y.o.  female who you are currently following for routine health maintenance. She is maintained on Plavix and Crestor. Patient's family states she has been having mini strokes.  She has a slight left facial droop. She did have noninvasive testing performed at an outside facility, which I did review.  MRI does show chronic lacunar infarcts right and small vessel disease. CTA of the neck showed significant bilateral vertebral disease.     Past Medical History:   Diagnosis Date   • Arthritis    • Asthma    • Cancer (HCC)     Female    • Depression    • Hearing loss    • Heart attack (HCC)    • Heart disease    • High blood pressure    • Hyperlipidemia    • Hypertension    • Hypertension    • Kidney stone    • Stroke (HCC)        Past Surgical History:   Procedure Laterality Date   • BACK SURGERY      40 Years Ago x2    • BLADDER SURGERY     • CARDIAC SURGERY      3 years ago in [facility]    • CHOLECYSTECTOMY     • HYSTERECTOMY     • INCONTINENCE SURGERY      x2  Dr Lindsay   • NECK SURGERY      30 years ago yamil barber        Family History   Problem Relation Age of Onset   • COPD  Father    • Lung disease Father    • Heart disease Father    • Heart disease Mother    • Cancer Mother    • Stroke Mother    • Heart disease Sister    • Breast cancer Sister    • Diabetes Brother    • COPD Son    • Lung cancer Son    • Cancer Daughter    • Hepatitis Daughter        Social History     Socioeconomic History   • Marital status:    Tobacco Use   • Smoking status: Never Smoker   • Smokeless tobacco: Never Used   Substance and Sexual Activity   • Alcohol use: No   • Drug use: No   • Sexual activity: Defer       No Known Allergies      Current Outpatient Medications:   •  BREO ELLIPTA 100-25 MCG/INH aerosol powder , INHALE 1 PUFF PO QD, Disp: , Rfl: 2  •  carvedilol (COREG) 3.125 MG tablet, Take 3.125 mg by mouth 2 (Two) Times a Day With Meals., Disp: , Rfl:   •  citalopram (CeleXA) 20 MG tablet, , Disp: , Rfl:   •  clopidogrel (PLAVIX) 75 MG tablet, Take 75 mg by mouth Daily., Disp: , Rfl:   •  diazePAM (VALIUM) 5 MG tablet, TK 1 T PO BID, Disp: , Rfl: 2  •  hydroCHLOROthiazide (MICROZIDE) 12.5 MG capsule, Take 12.5 mg by mouth Daily., Disp: , Rfl:   •  HYDROcodone-acetaminophen (NORCO)  MG per tablet, TK 1 T PO TID, Disp: , Rfl: 0  •  losartan (COZAAR) 50 MG tablet, Take 50 mg by mouth Daily., Disp: , Rfl:   •  meclizine (ANTIVERT) 25 MG tablet, Take 25 mg by mouth 3 (Three) Times a Day As Needed for Dizziness., Disp: , Rfl:   •  pantoprazole (PROTONIX) 40 MG EC tablet, Take 40 mg by mouth Daily., Disp: , Rfl:   •  rosuvastatin (CRESTOR) 20 MG tablet, Take  by mouth Daily., Disp: , Rfl: 2  •  tiZANidine (ZANAFLEX) 4 MG tablet, TK 1 T PO BID, Disp: , Rfl: 2    Review of Systems   Constitutional: Negative.    HENT: Negative.    Eyes: Negative.    Respiratory: Negative.    Cardiovascular: Negative.    Gastrointestinal: Negative.    Endocrine: Negative.    Genitourinary: Negative.    Musculoskeletal: Negative.    Skin: Negative.    Allergic/Immunologic: Negative.    Neurological: Negative.       "   Memory issues   Hematological: Negative.    Psychiatric/Behavioral: Negative.    All other systems reviewed and are negative.    /74 (BP Location: Left arm, Patient Position: Sitting, Cuff Size: Adult)   Pulse 56   Ht 170.2 cm (67\")   Wt 65.3 kg (144 lb)   SpO2 96%   BMI 22.55 kg/m²     Physical Exam  Vitals and nursing note reviewed.   Constitutional:       Appearance: Normal appearance. She is well-developed and normal weight.   HENT:      Head: Normocephalic and atraumatic.   Eyes:      General: No scleral icterus.     Pupils: Pupils are equal, round, and reactive to light.   Neck:      Thyroid: No thyromegaly.      Vascular: No carotid bruit or JVD.   Cardiovascular:      Rate and Rhythm: Normal rate and regular rhythm.      Pulses:           Carotid pulses are 2+ on the right side and 2+ on the left side.       Femoral pulses are 2+ on the right side and 2+ on the left side.       Popliteal pulses are 2+ on the right side and 2+ on the left side.        Dorsalis pedis pulses are 2+ on the right side and 2+ on the left side.        Posterior tibial pulses are 2+ on the right side and 2+ on the left side.      Heart sounds: Normal heart sounds.   Pulmonary:      Effort: Pulmonary effort is normal.      Breath sounds: Normal breath sounds.   Abdominal:      General: Bowel sounds are normal. There is no distension or abdominal bruit.      Palpations: Abdomen is soft. There is no mass.      Tenderness: There is no abdominal tenderness.   Musculoskeletal:         General: Normal range of motion.      Cervical back: Neck supple.   Lymphadenopathy:      Cervical: No cervical adenopathy.   Skin:     General: Skin is warm and dry.   Neurological:      Mental Status: She is alert and oriented to person, place, and time.      Cranial Nerves: No cranial nerve deficit.      Sensory: No sensory deficit.   Psychiatric:         Mood and Affect: Mood normal.         Behavior: Behavior normal.         Thought " Content: Thought content normal.         Judgment: Judgment normal.                 Patient Active Problem List   Diagnosis   • Hypertension   • Hyperlipidemia   • Stroke (HCC)        Diagnosis Plan   1. Bilateral carotid artery stenosis  US Carotid Bilateral   2. Primary hypertension     3. Hyperlipidemia, unspecified hyperlipidemia type     4. Vertebral artery disease (HCC)         Plan: After thoroughly evaluating Felipa Guerrero, I believe the best course of action is to remain conservative from a vascular surgery standpoint.  I carefully reviewed her CTA which does show vertebral artery disease at both takeoffs.  This does not require surgical intervention at this time.  Her strokes were caused by small vessel disease in the brain.  We did have a lengthy discussion about minimizing/controlling her risk factors that contribute to vascular disease.  I will see her back in 1 years time with a repeat carotid duplex for continued surveillance. I did discuss vascular risk factors as they pertain to the progression of vascular disease including controlling hypertension and hyperlipidemia.  These risk factors are currently controlled and stable.  The patient is to continue taking their medications as previously discussed.   This was all discussed in full with complete understanding.  Thank you for allowing me to participate in the care of your patient.  Please do not hesitate to call with any questions or concerns.  We will keep you aware of any further encounters with Felipa Guerrero.        Sincerely yours,         DO Jodi Martniez Jonathan E, MD

## 2021-10-21 NOTE — PROGRESS NOTES
October 21, 2021    Hello, may I speak with Felipa Guerrero?    My name is TIMOTHY SHARMA      I am  with [facility]  [address], [address]  [facility] [place] [facility] [address] [phone].    Before we get started may I verify your date of birth? 1945    I am calling to officially welcome you to our practice and ask about your recent visit. Is this a good time to talk? yes    Tell me about your visit with us. What things went well?  EVERYONE WAS NICE AND EVERYTHING WENT GREAT       We're always looking for ways to make our patients' experiences even better. Do you have recommendations on ways we may improve?  no    Overall were you satisfied with your first visit to our practice? yes       I appreciate you taking the time to speak with me today. Is there anything else I can do for you? no      Thank you, and have a great day.

## 2021-10-25 ENCOUNTER — OFFICE VISIT (OUTPATIENT)
Dept: UROLOGY | Facility: CLINIC | Age: 76
End: 2021-10-25

## 2021-10-25 VITALS — HEIGHT: 67 IN | WEIGHT: 145.6 LBS | BODY MASS INDEX: 22.85 KG/M2 | TEMPERATURE: 98.7 F

## 2021-10-25 DIAGNOSIS — R32 URINARY INCONTINENCE, UNSPECIFIED TYPE: Primary | ICD-10-CM

## 2021-10-25 DIAGNOSIS — N28.89 LEFT RENAL MASS: ICD-10-CM

## 2021-10-25 DIAGNOSIS — Z87.442 HISTORY OF KIDNEY STONES: ICD-10-CM

## 2021-10-25 LAB
BILIRUB BLD-MCNC: NEGATIVE MG/DL
CLARITY, POC: CLEAR
COLOR UR: YELLOW
GLUCOSE UR STRIP-MCNC: NEGATIVE MG/DL
KETONES UR QL: NEGATIVE
LEUKOCYTE EST, POC: NEGATIVE
NITRITE UR-MCNC: NEGATIVE MG/ML
PH UR: 7 [PH] (ref 5–8)
PROT UR STRIP-MCNC: NEGATIVE MG/DL
RBC # UR STRIP: NEGATIVE /UL
SP GR UR: 1.02 (ref 1–1.03)
UROBILINOGEN UR QL: NORMAL

## 2021-10-25 PROCEDURE — 81001 URINALYSIS AUTO W/SCOPE: CPT | Performed by: PHYSICIAN ASSISTANT

## 2021-10-25 PROCEDURE — 99202 OFFICE O/P NEW SF 15 MIN: CPT | Performed by: PHYSICIAN ASSISTANT

## 2021-10-25 PROCEDURE — 51798 US URINE CAPACITY MEASURE: CPT | Performed by: PHYSICIAN ASSISTANT

## 2021-10-25 NOTE — PROGRESS NOTES
Subjective    Ms. Guerrero is 76 y.o. female    Chief Complaint: Urinary Incontinence.     History of Present Illness    The following portions of the patient's history were reviewed and updated as appropriate: allergies, current medications, past family history, past medical history, past social history, past surgical history and problem list.    Review of Systems   Constitutional: Negative for chills and fever.   Gastrointestinal: Negative for abdominal pain, anal bleeding and blood in stool.   Genitourinary: Negative for dysuria, flank pain, frequency, hematuria, urgency and vaginal pain.         Current Outpatient Medications:   •  BREO ELLIPTA 100-25 MCG/INH aerosol powder , INHALE 1 PUFF PO QD, Disp: , Rfl: 2  •  carvedilol (COREG) 3.125 MG tablet, Take 3.125 mg by mouth 2 (Two) Times a Day With Meals., Disp: , Rfl:   •  citalopram (CeleXA) 20 MG tablet, , Disp: , Rfl:   •  clopidogrel (PLAVIX) 75 MG tablet, Take 75 mg by mouth Daily., Disp: , Rfl:   •  diazePAM (VALIUM) 5 MG tablet, TK 1 T PO BID, Disp: , Rfl: 2  •  hydroCHLOROthiazide (MICROZIDE) 12.5 MG capsule, Take 12.5 mg by mouth Daily., Disp: , Rfl:   •  HYDROcodone-acetaminophen (NORCO)  MG per tablet, TK 1 T PO TID, Disp: , Rfl: 0  •  losartan (COZAAR) 50 MG tablet, Take 50 mg by mouth Daily., Disp: , Rfl:   •  meclizine (ANTIVERT) 25 MG tablet, Take 25 mg by mouth 3 (Three) Times a Day As Needed for Dizziness., Disp: , Rfl:   •  pantoprazole (PROTONIX) 40 MG EC tablet, Take 40 mg by mouth Daily., Disp: , Rfl:   •  rosuvastatin (CRESTOR) 20 MG tablet, Take  by mouth Daily., Disp: , Rfl: 2  •  tiZANidine (ZANAFLEX) 4 MG tablet, TK 1 T PO BID, Disp: , Rfl: 2    Past Medical History:   Diagnosis Date   • Arthritis    • Asthma    • Cancer (HCC)     Female    • Depression    • Hearing loss    • Heart attack (HCC)    • Heart disease    • High blood pressure    • Hyperlipidemia    • Hypertension    • Hypertension    • Kidney stone    • Stroke (HCC)   "      Past Surgical History:   Procedure Laterality Date   • BACK SURGERY      40 Years Ago x2    • BLADDER SURGERY     • CARDIAC SURGERY      3 years ago in [facility]    • CHOLECYSTECTOMY     • HYSTERECTOMY     • INCONTINENCE SURGERY      x2  Dr Lindsay   • NECK SURGERY      30 years ago yamil barber        Social History     Socioeconomic History   • Marital status:    Tobacco Use   • Smoking status: Never Smoker   • Smokeless tobacco: Never Used   Vaping Use   • Vaping Use: Never used   Substance and Sexual Activity   • Alcohol use: No   • Drug use: No   • Sexual activity: Defer       Family History   Problem Relation Age of Onset   • COPD Father    • Lung disease Father    • Heart disease Father    • Heart disease Mother    • Cancer Mother    • Stroke Mother    • Heart disease Sister    • Breast cancer Sister    • Diabetes Brother    • COPD Son    • Lung cancer Son    • Cancer Daughter    • Hepatitis Daughter        Objective    Temp 98.7 °F (37.1 °C)   Ht 170.2 cm (67\")   Wt 66 kg (145 lb 9.6 oz)   BMI 22.80 kg/m²     Physical Exam        Results for orders placed or performed in visit on 03/18/21   COVID-19,APTIMA PANTHER,PAD IN-HOUSE,NP/OP/NASAL SWAB IN UTM/VTM/SALINE/LIQUID AMIES TRANSPORT MEDIA/NP WASH OR ASPIRATE, 24 HR TAT - Swab, Nasal Cavity    Specimen: Nasal Cavity; Swab   Result Value Ref Range    COVID19 Not Detected Not Detected - Ref. Range   Bladder Scan interpretation  Estimation of residual urine via abdominal ultrasound  Residual Urine: 0ml  Indication: Incontinence  Position: Supine  Examination: Incremental scanning of the suprapubic area using 3 MHz transducer using copious amounts of acoustic gel.   Findings: An anechoic area was demonstrated which represented the bladder, with measurement of residual urine as noted. I inspected this myself. In that the residual urine was stable or insignificant, no treatment will be necessary at this time.     Assessment " and Plan    Diagnoses and all orders for this visit:    1. Urinary incontinence, unspecified type (Primary)  -     POC Urinalysis Dipstick, Multipro

## 2021-11-08 ENCOUNTER — OFFICE VISIT (OUTPATIENT)
Dept: CARDIOLOGY | Facility: CLINIC | Age: 76
End: 2021-11-08

## 2021-11-08 VITALS
OXYGEN SATURATION: 97 % | BODY MASS INDEX: 22.6 KG/M2 | SYSTOLIC BLOOD PRESSURE: 138 MMHG | WEIGHT: 144 LBS | HEIGHT: 67 IN | HEART RATE: 64 BPM | DIASTOLIC BLOOD PRESSURE: 90 MMHG

## 2021-11-08 DIAGNOSIS — E78.2 MIXED HYPERLIPIDEMIA: ICD-10-CM

## 2021-11-08 DIAGNOSIS — I10 ESSENTIAL HYPERTENSION: ICD-10-CM

## 2021-11-08 DIAGNOSIS — R29.818 SUSPECTED SLEEP APNEA: ICD-10-CM

## 2021-11-08 DIAGNOSIS — I25.810 CORONARY ARTERY DISEASE INVOLVING CORONARY BYPASS GRAFT OF NATIVE HEART WITHOUT ANGINA PECTORIS: Primary | ICD-10-CM

## 2021-11-08 DIAGNOSIS — R06.09 DYSPNEA ON EXERTION: ICD-10-CM

## 2021-11-08 PROCEDURE — 99204 OFFICE O/P NEW MOD 45 MIN: CPT | Performed by: INTERNAL MEDICINE

## 2021-11-08 PROCEDURE — 93000 ELECTROCARDIOGRAM COMPLETE: CPT | Performed by: INTERNAL MEDICINE

## 2021-11-08 NOTE — PROGRESS NOTES
Reason for Visit: Abnormal stress echo.    HPI:  Felipa Guerrero is a 76 y.o. female is here today for consultation at the request of Dr. Zapata for evaluation of an abnormal stress echo.  She has a history of coronary artery disease and previously had CABG.  Dobutamine stress echo from 10/19/2021 at [facility] was interpreted as normal left ventricular contractility both at rest and during dobutamine infusion.    She has been feeling tired and fatigued.  She doesn't have much energy and wants to sleep all the time.  She gets out of breath easily and she feels like it is with mild activity.  She feels her heart beating fast when she is doing anything.  She walks down the road intermittently for exercise.  She denies any chest pain, dizziness, syncope, PND, or orthopnea.  Her blood pressure is mildly elevated today but she notes that she usually normal at home.    Previous Cardiac Testing and Procedures:  -VIDA (1/5/2017) no significant arterial insufficiency bilaterally  -[facility]2019[facility]  -Holter Monitor (7/17/2019) rare PAC's and PVC's with 4 runs of nonsustained PAT up to 4 beats  -Echo (6/25/2020) EF 47%, hypokinetic septum, normal valves, normal LV size  -Carotid ultrasound (5/20/2021) mild less than 50% bilaterally, antegrade flow in both vertebral arteries  -BMP (9/7/2021) creatinine 0.78, potassium 3.5, sodium 140  -Lipid panel (9/7/2021) total cholesterol 173, HDL 46, LDL 74, triglycerides 260  -Dobutamine stress echo (10/19/2021) clinically and electrically negative, normal left ventricular contractility both at rest and during dobutamine infusion    Patient Active Problem List   Diagnosis   • Essential hypertension   • Mixed hyperlipidemia   • Stroke (HCC)   • Coronary artery disease involving coronary bypass graft of native heart without angina pectoris       Social History     Tobacco Use   • Smoking status: Never Smoker   • Smokeless tobacco: Never Used   Vaping Use   •  Vaping Use: Never used   Substance Use Topics   • Alcohol use: No   • Drug use: No       Family History   Problem Relation Age of Onset   • COPD Father    • Lung disease Father    • Heart disease Father    • Heart disease Mother    • Cancer Mother    • Stroke Mother    • Heart disease Sister    • Breast cancer Sister    • Diabetes Brother    • COPD Son    • Lung cancer Son    • Cancer Daughter    • Hepatitis Daughter        The following portions of the patient's history were reviewed and updated as appropriate: allergies, current medications, past family history, past medical history, past social history, past surgical history and problem list.      Current Outpatient Medications:   •  BREO ELLIPTA 100-25 MCG/INH aerosol powder , INHALE 1 PUFF PO QD, Disp: , Rfl: 2  •  carvedilol (COREG) 3.125 MG tablet, Take 3.125 mg by mouth 2 (Two) Times a Day With Meals., Disp: , Rfl:   •  citalopram (CeleXA) 20 MG tablet, , Disp: , Rfl:   •  clopidogrel (PLAVIX) 75 MG tablet, Take 75 mg by mouth Daily., Disp: , Rfl:   •  diazePAM (VALIUM) 5 MG tablet, TK 1 T PO BID, Disp: , Rfl: 2  •  hydroCHLOROthiazide (MICROZIDE) 12.5 MG capsule, Take 12.5 mg by mouth Daily., Disp: , Rfl:   •  HYDROcodone-acetaminophen (NORCO)  MG per tablet, TK 1 T PO TID, Disp: , Rfl: 0  •  losartan (COZAAR) 50 MG tablet, Take 50 mg by mouth Daily., Disp: , Rfl:   •  meclizine (ANTIVERT) 25 MG tablet, Take 25 mg by mouth 3 (Three) Times a Day As Needed for Dizziness., Disp: , Rfl:   •  Mirabegron ER (Myrbetriq) 25 MG tablet sustained-release 24 hour 24 hr tablet, Take 1 tablet by mouth Daily for 28 days., Disp: 28 tablet, Rfl: 0  •  pantoprazole (PROTONIX) 40 MG EC tablet, Take 40 mg by mouth Daily., Disp: , Rfl:   •  rosuvastatin (CRESTOR) 20 MG tablet, Take  by mouth Daily., Disp: , Rfl: 2  •  tiZANidine (ZANAFLEX) 4 MG tablet, TK 1 T PO BID, Disp: , Rfl: 2    Review of Systems   Constitutional: Positive for malaise/fatigue. Negative for chills  "and fever.   Cardiovascular: Positive for dyspnea on exertion. Negative for chest pain and paroxysmal nocturnal dyspnea.   Respiratory: Negative for cough and shortness of breath.    Skin: Negative for rash.   Gastrointestinal: Negative for abdominal pain and heartburn.   Neurological: Positive for weakness. Negative for dizziness and numbness.       Objective   /90 (BP Location: Left arm, Patient Position: Sitting, Cuff Size: Adult)   Pulse 64   Ht 170.2 cm (67\")   Wt 65.3 kg (144 lb)   SpO2 97%   BMI 22.55 kg/m²   Constitutional:       Appearance: Well-developed.   HENT:      Head: Normocephalic and atraumatic.   Pulmonary:      Effort: Pulmonary effort is normal.      Breath sounds: Normal breath sounds.   Cardiovascular:      Normal rate. Regular rhythm.      Murmurs: There is no murmur.      No gallop. No click.   Edema:     Peripheral edema absent.   Skin:     General: Skin is warm and dry.   Neurological:      Mental Status: Alert and oriented to person, place, and time.         ECG 12 Lead    Date/Time: 11/8/2021 9:28 AM  Performed by: Angelo Saleem MD  Authorized by: Angelo Saleem MD   Comparison: compared with previous ECG from 8/3/2016  Similar to previous ECG  Rhythm: sinus rhythm  Rate: normal    Clinical impression: normal ECG              ICD-10-CM ICD-9-CM   1. Coronary artery disease involving coronary bypass graft of native heart without angina pectoris  I25.810 414.05   2. Dyspnea on exertion  R06.00 786.09   3. Essential hypertension  I10 401.9   4. Mixed hyperlipidemia  E78.2 272.2   5. Suspected sleep apnea  R29.818 781.99         Assessment/Plan:  1.  Coronary artery disease: History of CABG in 2019.  Low risk dobutamine stress echo from 10/19/2021.  Obtain copies of previous CABG report.  Recent dobutamine stress echo from 10/19/2021 was reviewed.  It was clinically electrically negative for ischemia with normal wall motion of both rest and stress consistent with a low risk " study.  She denies any chest pain symptoms.  Continue Plavix, carvedilol, and rosuvastatin.    2.  Dyspnea on exertion: Unclear etiology.  Evaluate further with an echocardiogram since previous echo from 2020 showed EF of 47% with septal hypokinesis.    3.  Essential hypertension: Blood pressure is mildly elevated today.  Patient reports good control at home.  Continue current therapy and monitor.    4.  Mixed hyperlipidemia: Borderline control lipid panel from 9/7/2021 with elevated triglycerides and acceptable cholesterol.  Continue rosuvastatin and  on lifestyle modification.    5.  Suspected sleep apnea: Patient reports significant fatigue and tiredness during the day.  Refer to sleep medicine clinic for further evaluation.

## 2021-11-15 ENCOUNTER — APPOINTMENT (OUTPATIENT)
Dept: CT IMAGING | Facility: HOSPITAL | Age: 76
End: 2021-11-15

## 2021-11-15 ENCOUNTER — HOSPITAL ENCOUNTER (OUTPATIENT)
Dept: CT IMAGING | Facility: HOSPITAL | Age: 76
Discharge: HOME | End: 2021-11-15
Admitting: PHYSICIAN ASSISTANT

## 2021-11-15 DIAGNOSIS — N28.89 LEFT RENAL MASS: ICD-10-CM

## 2021-11-15 DIAGNOSIS — R32 URINARY INCONTINENCE, UNSPECIFIED TYPE: ICD-10-CM

## 2021-11-15 DIAGNOSIS — Z87.442 HISTORY OF KIDNEY STONES: ICD-10-CM

## 2021-11-15 LAB — CREAT BLDA-MCNC: 1 MG/DL (ref 0.6–1.3)

## 2021-11-15 PROCEDURE — 74178 CT ABD&PLV WO CNTR FLWD CNTR: CPT

## 2021-11-15 PROCEDURE — 82565 ASSAY OF CREATININE: CPT

## 2021-11-15 PROCEDURE — 25010000002 IOPAMIDOL 61 % SOLUTION: Performed by: PHYSICIAN ASSISTANT

## 2021-11-15 RX ADMIN — IOPAMIDOL 100 ML: 612 INJECTION, SOLUTION INTRAVENOUS at 10:09

## 2021-11-16 ENCOUNTER — TELEPHONE (OUTPATIENT)
Dept: CARDIOLOGY | Facility: CLINIC | Age: 76
End: 2021-11-16

## 2021-11-16 NOTE — TELEPHONE ENCOUNTER
----- Message from Angelo Saleem MD sent at 11/8/2021 11:55 AM CST -----  Please obtain copy of the previous cath and CABG report from 2019.  She reports the CABG was done in [facility].  I saw from your documentation her cardiologist was at [facility].

## 2021-11-22 NOTE — PROGRESS NOTES
Subjective    Ms. Guerrero is 76 y.o. female    Chief Complaint: 1 Month follow up for Urinary Incontinence.   History of Present Illness    The following portions of the patient's history were reviewed and updated as appropriate: allergies, current medications, past family history, past medical history, past social history, past surgical history and problem list.    Review of Systems   Constitutional: Negative for chills and fever.   Gastrointestinal: Negative for abdominal pain, anal bleeding and blood in stool.   Genitourinary: Negative for decreased urine volume, difficulty urinating, dyspareunia, dysuria, enuresis, flank pain, frequency, genital sores, hematuria, menstrual problem, pelvic pain, urgency, vaginal bleeding, vaginal discharge and vaginal pain.         Current Outpatient Medications:   •  BREO ELLIPTA 100-25 MCG/INH aerosol powder , INHALE 1 PUFF PO QD, Disp: , Rfl: 2  •  carvedilol (COREG) 3.125 MG tablet, Take 3.125 mg by mouth 2 (Two) Times a Day With Meals., Disp: , Rfl:   •  citalopram (CeleXA) 20 MG tablet, , Disp: , Rfl:   •  clopidogrel (PLAVIX) 75 MG tablet, Take 75 mg by mouth Daily., Disp: , Rfl:   •  diazePAM (VALIUM) 5 MG tablet, TK 1 T PO BID, Disp: , Rfl: 2  •  hydroCHLOROthiazide (MICROZIDE) 12.5 MG capsule, Take 12.5 mg by mouth Daily., Disp: , Rfl:   •  HYDROcodone-acetaminophen (NORCO)  MG per tablet, TK 1 T PO TID, Disp: , Rfl: 0  •  losartan (COZAAR) 50 MG tablet, Take 50 mg by mouth Daily., Disp: , Rfl:   •  meclizine (ANTIVERT) 25 MG tablet, Take 25 mg by mouth 3 (Three) Times a Day As Needed for Dizziness., Disp: , Rfl:   •  pantoprazole (PROTONIX) 40 MG EC tablet, Take 40 mg by mouth Daily., Disp: , Rfl:   •  rosuvastatin (CRESTOR) 20 MG tablet, Take  by mouth Daily., Disp: , Rfl: 2  •  tiZANidine (ZANAFLEX) 4 MG tablet, TK 1 T PO BID, Disp: , Rfl: 2    Past Medical History:   Diagnosis Date   • Arthritis    • Asthma    • Cancer (HCC)     Female    • COPD (chronic  "obstructive pulmonary disease) (HCC)    • Coronary artery disease    • Depression    • Hearing loss    • Heart attack (HCC)    • Heart disease    • High blood pressure    • Hyperlipidemia    • Hypertension    • Hypertension    • Kidney stone    • Stroke (HCC)        Past Surgical History:   Procedure Laterality Date   • BACK SURGERY      40 Years Ago x2    • BLADDER SURGERY     • CARDIAC CATHETERIZATION     • CARDIAC SURGERY      3 years ago in [facility]    • CHOLECYSTECTOMY     • CORONARY ARTERY BYPASS GRAFT  03/2020     [facility]   • HYSTERECTOMY     • INCONTINENCE SURGERY      x2  Dr Lindsay   • NECK SURGERY      30 years ago [facility]        Social History     Socioeconomic History   • Marital status:    Tobacco Use   • Smoking status: Never Smoker   • Smokeless tobacco: Never Used   Vaping Use   • Vaping Use: Never used   Substance and Sexual Activity   • Alcohol use: No   • Drug use: No   • Sexual activity: Defer       Family History   Problem Relation Age of Onset   • COPD Father    • Lung disease Father    • Heart disease Father    • Heart disease Mother    • Cancer Mother    • Stroke Mother    • Heart disease Sister    • Breast cancer Sister    • Diabetes Brother    • COPD Son    • Lung cancer Son    • Cancer Daughter    • Hepatitis Daughter        Objective    Temp 97 °F (36.1 °C)   Ht 170.2 cm (67.01\")   Wt 66.7 kg (147 lb)   BMI 23.02 kg/m²     Physical Exam        Results for orders placed or performed in visit on 11/29/21   POC Urinalysis Dipstick, Multipro    Specimen: Urine   Result Value Ref Range    Color Yellow Yellow, Straw, Dark Yellow, Samira    Clarity, UA Clear Clear    Glucose, UA Negative Negative, 1000 mg/dL (3+) mg/dL    Bilirubin Negative Negative    Ketones, UA Negative Negative    Specific Gravity  1.010 1.005 - 1.030    Blood, UA Negative Negative    pH, Urine 6.5 5.0 - 8.0    Protein, POC Negative Negative mg/dL    Urobilinogen, UA Normal Normal    " Nitrite, UA Negative Negative    Leukocytes Negative Negative   Bladder Scan interpretation  Estimation of residual urine via abdominal ultrasound  Residual Urine:0ml  Indication: Incontinence.  Position: Supine  Examination: Incremental scanning of the suprapubic area using 3 MHz transducer using copious amounts of acoustic gel.   Findings: An anechoic area was demonstrated which represented the bladder, with measurement of residual urine as noted. I inspected this myself. In that the residual urine was stable or insignificant, no treatment will be necessary at this time.     Independent review of the CT scan of abdomen and pelvis with and without contrast  The CT scan of the abdomen/pelvis done with and without contrast is available for me to review.  Treatment recommendations require an independent review.  First I scanned the liver, spleen, and bowel pattern.  The retroperitoneum including the major vessels and lymphatic packages are briefly reviewed.  This film as been reviewed by the radiologist to determine any non urologic abnormalities that are present.  The kidneys are closely inspected for size, symmetry, contour, parenchymal thickness, perinephric reaction, presence of calcifications, and intrarenal dilation of the collecting system.  The ureters are inspected for their course, caliber, and any calcifications.  The bladder is inspected for its thickness, size, and presence of any calcifications.  This scan shows a benign-appearing cyst within the hilum of the left kidney which measures approximately 18 mm.  Also seen probable calyceal diverticulum in the right kidney there is a tiny approximate 3 mm cyst.    Assessment and Plan    Diagnoses and all orders for this visit:    1. Urinary incontinence, unspecified type (Primary)  -     POC Urinalysis Dipstick, Multipro    2. Renal cyst, left    No solid renal mass seen on CT scan there is normal drainage of both kidneys no obvious calcifications indicate  kidney stones.  Cyst seen in the left kidney.  Her serum creatinine was 1.00.  She has had some improvement on Myrbetriq 25 mg I gave her samples last visit.  I sent prescription to her pharmacy for Myrbetriq.    Patient will follow up in 6 weeks to assess for improvement in her urinary symptoms.  I also gave her the information pamphlet on InterStim which she may be a candidate for.

## 2021-11-29 ENCOUNTER — OFFICE VISIT (OUTPATIENT)
Dept: UROLOGY | Facility: CLINIC | Age: 76
End: 2021-11-29

## 2021-11-29 VITALS — TEMPERATURE: 97 F | HEIGHT: 67 IN | WEIGHT: 147 LBS | BODY MASS INDEX: 23.07 KG/M2

## 2021-11-29 DIAGNOSIS — R32 URINARY INCONTINENCE, UNSPECIFIED TYPE: Primary | ICD-10-CM

## 2021-11-29 DIAGNOSIS — N28.1 RENAL CYST, LEFT: ICD-10-CM

## 2021-11-29 LAB
BILIRUB BLD-MCNC: NEGATIVE MG/DL
CLARITY, POC: CLEAR
COLOR UR: YELLOW
GLUCOSE UR STRIP-MCNC: NEGATIVE MG/DL
KETONES UR QL: NEGATIVE
LEUKOCYTE EST, POC: NEGATIVE
NITRITE UR-MCNC: NEGATIVE MG/ML
PH UR: 6.5 [PH] (ref 5–8)
PROT UR STRIP-MCNC: NEGATIVE MG/DL
RBC # UR STRIP: NEGATIVE /UL
SP GR UR: 1.01 (ref 1–1.03)
UROBILINOGEN UR QL: NORMAL

## 2021-11-29 PROCEDURE — 81001 URINALYSIS AUTO W/SCOPE: CPT | Performed by: PHYSICIAN ASSISTANT

## 2021-11-29 PROCEDURE — 51798 US URINE CAPACITY MEASURE: CPT | Performed by: PHYSICIAN ASSISTANT

## 2021-11-29 PROCEDURE — 99213 OFFICE O/P EST LOW 20 MIN: CPT | Performed by: PHYSICIAN ASSISTANT

## 2021-12-27 ENCOUNTER — HOSPITAL ENCOUNTER (OUTPATIENT)
Dept: CARDIOLOGY | Facility: HOSPITAL | Age: 76
Discharge: HOME | End: 2021-12-27
Admitting: INTERNAL MEDICINE

## 2021-12-27 VITALS
BODY MASS INDEX: 23.07 KG/M2 | HEIGHT: 67 IN | DIASTOLIC BLOOD PRESSURE: 69 MMHG | SYSTOLIC BLOOD PRESSURE: 156 MMHG | WEIGHT: 147 LBS

## 2021-12-27 LAB
BH CV ECHO MEAS - AO MAX PG (FULL): 0 MMHG
BH CV ECHO MEAS - AO MAX PG: 4.4 MMHG
BH CV ECHO MEAS - AO MEAN PG (FULL): 0 MMHG
BH CV ECHO MEAS - AO MEAN PG: 2 MMHG
BH CV ECHO MEAS - AO ROOT AREA (BSA CORRECTED): 1.6
BH CV ECHO MEAS - AO ROOT AREA: 6.2 CM^2
BH CV ECHO MEAS - AO ROOT DIAM: 2.8 CM
BH CV ECHO MEAS - AO V2 MAX: 105 CM/SEC
BH CV ECHO MEAS - AO V2 MEAN: 70.6 CM/SEC
BH CV ECHO MEAS - AO V2 VTI: 23.3 CM
BH CV ECHO MEAS - AVA(I,A): 3.5 CM^2
BH CV ECHO MEAS - AVA(I,D): 3.5 CM^2
BH CV ECHO MEAS - AVA(V,A): 3.5 CM^2
BH CV ECHO MEAS - AVA(V,D): 3.5 CM^2
BH CV ECHO MEAS - BSA(HAYCOCK): 1.8 M^2
BH CV ECHO MEAS - BSA: 1.8 M^2
BH CV ECHO MEAS - BZI_BMI: 23 KILOGRAMS/M^2
BH CV ECHO MEAS - BZI_METRIC_HEIGHT: 170.2 CM
BH CV ECHO MEAS - BZI_METRIC_WEIGHT: 66.7 KG
BH CV ECHO MEAS - EDV(MOD-SP4): 80 ML
BH CV ECHO MEAS - EF(MOD-SP4): 70.8 %
BH CV ECHO MEAS - ESV(MOD-SP4): 23.4 ML
BH CV ECHO MEAS - LA DIMENSION: 2.8 CM
BH CV ECHO MEAS - LA/AO: 1
BH CV ECHO MEAS - LAT PEAK E' VEL: 6.7 CM/SEC
BH CV ECHO MEAS - LV DIASTOLIC VOL/BSA (35-75): 45.1 ML/M^2
BH CV ECHO MEAS - LV MAX PG: 4.4 MMHG
BH CV ECHO MEAS - LV MEAN PG: 2 MMHG
BH CV ECHO MEAS - LV SYSTOLIC VOL/BSA (12-30): 13.2 ML/M^2
BH CV ECHO MEAS - LV V1 MAX: 105 CM/SEC
BH CV ECHO MEAS - LV V1 MEAN: 71.9 CM/SEC
BH CV ECHO MEAS - LV V1 VTI: 23.7 CM
BH CV ECHO MEAS - LVLD AP4: 7.9 CM
BH CV ECHO MEAS - LVLS AP4: 6.3 CM
BH CV ECHO MEAS - LVOT AREA (M): 3.5 CM^2
BH CV ECHO MEAS - LVOT AREA: 3.5 CM^2
BH CV ECHO MEAS - LVOT DIAM: 2.1 CM
BH CV ECHO MEAS - MED PEAK E' VEL: 3.48 CM/SEC
BH CV ECHO MEAS - MV A MAX VEL: 92.4 CM/SEC
BH CV ECHO MEAS - MV DEC TIME: 0.39 SEC
BH CV ECHO MEAS - MV E MAX VEL: 66.1 CM/SEC
BH CV ECHO MEAS - MV E/A: 0.72
BH CV ECHO MEAS - PA MAX PG: 1.8 MMHG
BH CV ECHO MEAS - PA V2 MAX: 66.9 CM/SEC
BH CV ECHO MEAS - RAP SYSTOLE: 5 MMHG
BH CV ECHO MEAS - RVSP: 25.4 MMHG
BH CV ECHO MEAS - SI(AO): 80.9 ML/M^2
BH CV ECHO MEAS - SI(LVOT): 46.3 ML/M^2
BH CV ECHO MEAS - SI(MOD-SP4): 31.9 ML/M^2
BH CV ECHO MEAS - SV(AO): 143.5 ML
BH CV ECHO MEAS - SV(LVOT): 82.1 ML
BH CV ECHO MEAS - SV(MOD-SP4): 56.6 ML
BH CV ECHO MEAS - TR MAX VEL: 226 CM/SEC
BH CV ECHO MEASUREMENTS AVERAGE E/E' RATIO: 12.99
LEFT ATRIUM VOLUME INDEX: 21.2 ML/M2
LEFT ATRIUM VOLUME: 37.5 CM3
MAXIMAL PREDICTED HEART RATE: 143 BPM
STRESS TARGET HR: 122 BPM

## 2021-12-27 PROCEDURE — 25010000002 PERFLUTREN 6.52 MG/ML SUSPENSION: Performed by: INTERNAL MEDICINE

## 2021-12-27 PROCEDURE — 93306 TTE W/DOPPLER COMPLETE: CPT

## 2021-12-27 PROCEDURE — 93306 TTE W/DOPPLER COMPLETE: CPT | Performed by: INTERNAL MEDICINE

## 2021-12-27 RX ADMIN — PERFLUTREN 9.78 MG: 6.52 INJECTION, SUSPENSION INTRAVENOUS at 09:26

## 2021-12-28 ENCOUNTER — TELEPHONE (OUTPATIENT)
Dept: CARDIOLOGY | Facility: CLINIC | Age: 76
End: 2021-12-28

## 2021-12-28 NOTE — TELEPHONE ENCOUNTER
----- Message from Angelo Saleem MD sent at 12/28/2021 10:31 AM CST -----  Please let her know that the echo shows normal cardiac structure and function with mild age-related changes.

## 2022-01-03 ENCOUNTER — OFFICE VISIT (OUTPATIENT)
Dept: CARDIOLOGY | Facility: CLINIC | Age: 77
End: 2022-01-03

## 2022-01-03 VITALS
WEIGHT: 140 LBS | OXYGEN SATURATION: 98 % | HEIGHT: 67 IN | BODY MASS INDEX: 21.97 KG/M2 | DIASTOLIC BLOOD PRESSURE: 66 MMHG | SYSTOLIC BLOOD PRESSURE: 144 MMHG | HEART RATE: 74 BPM

## 2022-01-03 DIAGNOSIS — R29.818 SUSPECTED SLEEP APNEA: ICD-10-CM

## 2022-01-03 DIAGNOSIS — I10 ESSENTIAL HYPERTENSION: ICD-10-CM

## 2022-01-03 DIAGNOSIS — R06.09 DYSPNEA ON EXERTION: ICD-10-CM

## 2022-01-03 DIAGNOSIS — E78.2 MIXED HYPERLIPIDEMIA: ICD-10-CM

## 2022-01-03 DIAGNOSIS — I25.810 CORONARY ARTERY DISEASE INVOLVING CORONARY BYPASS GRAFT OF NATIVE HEART WITHOUT ANGINA PECTORIS: Primary | ICD-10-CM

## 2022-01-03 PROCEDURE — 99214 OFFICE O/P EST MOD 30 MIN: CPT | Performed by: NURSE PRACTITIONER

## 2022-01-03 RX ORDER — LOSARTAN POTASSIUM 50 MG/1
75 TABLET ORAL DAILY
Qty: 45 TABLET | Refills: 11 | Status: SHIPPED | OUTPATIENT
Start: 2022-01-03 | End: 2022-04-22 | Stop reason: SDUPTHER

## 2022-01-03 NOTE — PROGRESS NOTES
Subjective:     Encounter Date:01/03/2022      Patient ID: Felipa Guerrero is a 76 y.o. female with coronary artery disease s/p previous CABG, hypertension, and hyperlipidemia    Chief Complaint: 6 week follow up  Coronary Artery Disease  Presents for follow-up visit. Symptoms include shortness of breath. Pertinent negatives include no chest pain, chest pressure, chest tightness, dizziness, leg swelling or palpitations. The symptoms have been stable. Compliance with diet is good. Compliance with exercise is good. Compliance with medications is good.   Hypertension  This is a chronic problem. The current episode started more than 1 year ago. Associated symptoms include malaise/fatigue and shortness of breath. Pertinent negatives include no chest pain, orthopnea, palpitations or peripheral edema. Risk factors for coronary artery disease include dyslipidemia. Past treatments include angiotensin blockers and alpha 1 blockers. Current antihypertension treatment includes beta blockers and angiotensin blockers. Hypertensive end-organ damage includes CAD/MI.     Patient presents today for management of fatigue and dyspnea on exertion. Patient was sent to our office in 11/2021 following a dobutamine stress echo done 10/19/2021 at [facility] was interpreted as normal left ventricular contractility both as rest and during dobutamine infusion. Patient was complaining of fatigue and dyspnea on exertion in office. She had been doing some intermittent walking for exercise. An echo was ordered and revealed LVEF 61-65%, grade I diastolic dysfunction, normal right ventricular cavity size and systolic function, no significant valvular dysfunction, normal RVSP < 35 mmHg.  Patient was also referred for a sleep study due to fatigue and tiredness during the day. Granddaughter is with patient in office today. Patient reports that she has been coughing more over the past couple of days. She denies any fever but states that  since the weather changed her bronchitis has been flared up. Patient reports that she has chronic bronchitis and uses her inhaler daily; she states that she she been more short of breath and that her dyspnea seemed to be getting worse a couple of months ago. She denies any change from last office visit to now. She reports that she gets dyspneic with housework such as sweeping or vacuuming. She denies being able to be very active due to neck and back pain. She reports that her fatigue and tiredness has been unchanged. Granddaughter reports that she is scheduled with sleep medicine clinic this week. Patient denies any chest pain. She denies any leg swelling, orthopnea or PND. Patient denies any heart racing or palpitations. Patient reports that her dizziness spells are less frequent. Granddaughter states that she complained of dizziness yesterday while she was up cooking in the kitchen. Granddaughter states that she has noticed she complains of dizziness after standing to do something for awhile. Patient states that she just takes breaks often and it subsides. She has been monitoring her blood pressure at home and it has been running 120-156 and even 160 sometimes systolic/60-70s. She reports that her heart rate is usually 60-70's. Granddaughter reports that Dr Zapata felt that we didn't get all of patients testing and there was a myocardial perfusion study done on 10/19/2021. Patient follows with Dr Zapata as PCP.     Previous Cardiac Testing and Procedures:  -VIDA (1/5/2017) no significant arterial insufficiency bilaterally  -CABG (2/2019) [facility]  -Holter Monitor (7/17/2019) rare PAC's and PVC's with 4 runs of nonsustained PAT up to 4 beats  -Echo (6/25/2020) EF 47%, hypokinetic septum, normal valves, normal LV size  -Carotid ultrasound (5/20/2021) mild less than 50% bilaterally, antegrade flow in both vertebral arteries  -BMP (9/7/2021) creatinine 0.78, potassium 3.5, sodium 140  -Lipid panel (9/7/2021)  total cholesterol 173, HDL 46, LDL 74, triglycerides 260  -Dobutamine stress echo (10/19/2021) clinically and electrically negative, normal left ventricular contractility both at rest and during dobutamine infusion  -Echo (12/27/2021): LVEF 61-65%, grade I diastolic dysfunction, normal right ventricular cavity size and systolic function, no significant valvular dysfunction, normal RVSP < 35 mmHg    The following portions of the patient's history were reviewed and updated as appropriate: allergies, current medications, past family history, past medical history, past social history, past surgical history and problem list.    No Known Allergies    Current Outpatient Medications:   •  BREO ELLIPTA 100-25 MCG/INH aerosol powder , INHALE 1 PUFF PO QD, Disp: , Rfl: 2  •  carvedilol (COREG) 3.125 MG tablet, Take 3.125 mg by mouth 2 (Two) Times a Day With Meals., Disp: , Rfl:   •  citalopram (CeleXA) 20 MG tablet, , Disp: , Rfl:   •  clopidogrel (PLAVIX) 75 MG tablet, Take 75 mg by mouth Daily., Disp: , Rfl:   •  diazePAM (VALIUM) 5 MG tablet, TK 1 T PO BID, Disp: , Rfl: 2  •  hydroCHLOROthiazide (MICROZIDE) 12.5 MG capsule, Take 12.5 mg by mouth Daily., Disp: , Rfl:   •  HYDROcodone-acetaminophen (NORCO)  MG per tablet, TK 1 T PO TID, Disp: , Rfl: 0  •  losartan (COZAAR) 50 MG tablet, Take 50 mg by mouth Daily., Disp: , Rfl:   •  meclizine (ANTIVERT) 25 MG tablet, Take 25 mg by mouth 3 (Three) Times a Day As Needed for Dizziness., Disp: , Rfl:   •  Mirabegron ER (Myrbetriq) 25 MG tablet sustained-release 24 hour 24 hr tablet, Take 1 tablet by mouth Daily., Disp: 30 tablet, Rfl: 11  •  pantoprazole (PROTONIX) 40 MG EC tablet, Take 40 mg by mouth Daily., Disp: , Rfl:   •  rosuvastatin (CRESTOR) 20 MG tablet, Take  by mouth Daily., Disp: , Rfl: 2  •  tiZANidine (ZANAFLEX) 4 MG tablet, TK 1 T PO BID, Disp: , Rfl: 2  Past Medical History:   Diagnosis Date   • Arthritis    • Asthma    • Cancer (HCC)     Female    • COPD  (chronic obstructive pulmonary disease) (HCC)    • Coronary artery disease    • Depression    • Hearing loss    • Heart attack (HCC)    • Heart disease    • High blood pressure    • Hyperlipidemia    • Hypertension    • Hypertension    • Kidney stone    • Stroke (HCC)      Social History     Socioeconomic History   • Marital status:    Tobacco Use   • Smoking status: Never Smoker   • Smokeless tobacco: Never Used   Vaping Use   • Vaping Use: Never used   Substance and Sexual Activity   • Alcohol use: No   • Drug use: No   • Sexual activity: Defer       Review of Systems   Constitutional: Positive for malaise/fatigue.   Cardiovascular: Positive for dyspnea on exertion. Negative for chest pain, leg swelling, orthopnea, palpitations and syncope.   Respiratory: Positive for cough and shortness of breath. Negative for chest tightness.    Neurological: Negative for dizziness and weakness.   All other systems reviewed and are negative.         Objective:     Vitals reviewed.   Constitutional:       General: Not in acute distress.     Appearance: Normal appearance. Well-developed.   Eyes:      Pupils: Pupils are equal, round, and reactive to light.   HENT:      Head: Normocephalic and atraumatic.      Right Ear: Decreased hearing noted.      Left Ear: Decreased hearing noted.      Nose: Nose normal.   Neck:      Vascular: No carotid bruit.   Pulmonary:      Effort: Pulmonary effort is normal. No respiratory distress.      Breath sounds: Normal breath sounds. No wheezing. No rales.   Cardiovascular:      Normal rate. Regular rhythm.      Murmurs: There is no murmur.   Edema:     Peripheral edema absent.   Abdominal:      General: There is no distension.      Palpations: Abdomen is soft.   Musculoskeletal: Normal range of motion.      Cervical back: Normal range of motion and neck supple. Skin:     General: Skin is warm.      Findings: No erythema or rash.   Neurological:      Mental Status: Alert and oriented to  "person, place, and time.   Psychiatric:         Attention and Perception: Attention normal.         Mood and Affect: Mood normal.         Speech: Speech normal.         Behavior: Behavior normal.         Thought Content: Thought content normal.         Judgment: Judgment normal.         /66   Pulse 74   Ht 170.2 cm (67\")   Wt 63.5 kg (140 lb)   SpO2 98%   BMI 21.93 kg/m²     Procedures    Lab Review:   Lipid panel 9/07/2021:          I have personally reviewed labs, echo, [facility] records and past office notes prior to patients visit  Assessment:          Diagnosis Plan   1. Coronary artery disease involving coronary bypass graft of native heart without angina pectoris     2. Essential hypertension     3. Mixed hyperlipidemia     4. Dyspnea on exertion     5. Suspected sleep apnea            Plan:       1. CAD: s/p CABG in 2019. Dobutamine stress echo done 10/19/2021 that was low risk. Patient continues to remain chest pain free. Requesting records again to see if there is a myocardial perfusion study; also seeing about getting a disc of images from test for Dr Saleem to review. Continue plavix, carvedilol and rosuvastatin.     2. Dyspnea on exertion: Echo done 12/27/2021 that revealed LVEF 61-65%, grade I diastolic dysfunction, normal right ventricular cavity size and systolic function, no significant valvular dysfunction, normal RVSP < 35 mmHg.     3. Hypertension: increase losartan to 75 mg daily. Continue to monitor blood pressure and report is >140/90.     4. Hyperlipidemia: LDL 74 9/07/2021. Relatively well controlled. Continue rosuvastatin    5. Sleep apnea: 1/6/2022 appointment for sleep medicine clinic    Advance Care Planning   ACP discussion was held with the patient during this visit. Patient does not have an advance directive, information provided.    I spent 30 minutes caring for Felipa on this date of service. This time includes time spent by me in the following activities: preparing for the " visit, reviewing tests, obtaining and/or reviewing a separately obtained history, performing a medically appropriate examination and/or evaluation, counseling and educating the patient/family/caregiver, ordering medications, tests, or procedures and documenting information in the medical record    Patient is to return to office in 6 weeks or sooner if needed

## 2022-01-04 NOTE — PROGRESS NOTES
Subjective    Ms. Guerrero is 76 y.o. female    Chief Complaint: 6 Week follow up for Urinary Incontinence.    History of Present Illness  Patient is a 76-year-old female who returns for 6-week follow-up with history of urge incontinence.  Her symptoms are dramatically improved after taking Myrbetriq 25 mg.  She was also given information on urgent PC posterior tibial nerve stimulation treatments however given her improvement on medication she would like to continue the oral medications at this point.  She has had just 1 breakthrough leaking episode at night and she is not able to make it to the commode before her urine starts to leak.      The following portions of the patient's history were reviewed and updated as appropriate: allergies, current medications, past family history, past medical history, past social history, past surgical history and problem list.    Review of Systems      Current Outpatient Medications:   •  BREO ELLIPTA 100-25 MCG/INH aerosol powder , INHALE 1 PUFF PO QD, Disp: , Rfl: 2  •  carvedilol (COREG) 3.125 MG tablet, Take 3.125 mg by mouth 2 (Two) Times a Day With Meals., Disp: , Rfl:   •  citalopram (CeleXA) 20 MG tablet, , Disp: , Rfl:   •  clopidogrel (PLAVIX) 75 MG tablet, Take 75 mg by mouth Daily., Disp: , Rfl:   •  diazePAM (VALIUM) 5 MG tablet, TK 1 T PO BID, Disp: , Rfl: 2  •  hydroCHLOROthiazide (MICROZIDE) 12.5 MG capsule, Take 12.5 mg by mouth Daily., Disp: , Rfl:   •  HYDROcodone-acetaminophen (NORCO)  MG per tablet, TK 1 T PO TID, Disp: , Rfl: 0  •  losartan (COZAAR) 50 MG tablet, Take 1.5 tablets by mouth Daily., Disp: 45 tablet, Rfl: 11  •  meclizine (ANTIVERT) 25 MG tablet, Take 25 mg by mouth 3 (Three) Times a Day As Needed for Dizziness., Disp: , Rfl:   •  Mirabegron ER (Myrbetriq) 25 MG tablet sustained-release 24 hour 24 hr tablet, Take 1 tablet by mouth Daily., Disp: 30 tablet, Rfl: 11  •  pantoprazole (PROTONIX) 40 MG EC tablet, Take 40 mg by mouth Daily., Disp: ,  "Rfl:   •  rosuvastatin (CRESTOR) 20 MG tablet, Take  by mouth Daily., Disp: , Rfl: 2  •  tiZANidine (ZANAFLEX) 4 MG tablet, TK 1 T PO BID, Disp: , Rfl: 2    Past Medical History:   Diagnosis Date   • Arthritis    • Asthma    • Cancer (HCC)     Female    • COPD (chronic obstructive pulmonary disease) (HCC)    • Coronary artery disease    • Depression    • Hearing loss    • Heart attack (HCC)    • Heart disease    • High blood pressure    • Hyperlipidemia    • Hypertension    • Hypertension    • Kidney stone    • Stroke (HCC)        Past Surgical History:   Procedure Laterality Date   • BACK SURGERY      40 Years Ago x2    • BLADDER SURGERY     • CARDIAC CATHETERIZATION     • CARDIAC SURGERY      3 years ago in [facility]    • CHOLECYSTECTOMY     • CORONARY ARTERY BYPASS GRAFT  03/2020     [facility] X1    • HYSTERECTOMY     • INCONTINENCE SURGERY      x2  Dr Lindsay   • NECK SURGERY      30 years ago [facility]        Social History     Socioeconomic History   • Marital status:    Tobacco Use   • Smoking status: Never Smoker   • Smokeless tobacco: Never Used   Vaping Use   • Vaping Use: Never used   Substance and Sexual Activity   • Alcohol use: No   • Drug use: No   • Sexual activity: Defer       Family History   Problem Relation Age of Onset   • COPD Father    • Lung disease Father    • Heart disease Father    • Heart disease Mother    • Cancer Mother    • Stroke Mother    • Heart disease Sister    • Breast cancer Sister    • Diabetes Brother    • COPD Son    • Lung cancer Son    • Cancer Daughter    • Hepatitis Daughter        Objective    Temp 97.4 °F (36.3 °C)   Ht 170.2 cm (67\")   Wt 64.2 kg (141 lb 9.6 oz)   BMI 22.18 kg/m²     Physical Exam  Vitals reviewed.   Constitutional:       Appearance: Normal appearance.   HENT:      Head: Normocephalic and atraumatic.      Nose: No congestion.   Pulmonary:      Effort: Pulmonary effort is normal.   Neurological:      General: No focal " deficit present.      Mental Status: She is alert and oriented to person, place, and time.   Psychiatric:         Mood and Affect: Mood normal.         Behavior: Behavior normal.         Bladder scan:  Post void residual bladder scan 12 mL    Results for orders placed or performed in visit on 01/10/22   POC Urinalysis Dipstick, Multipro    Specimen: Urine   Result Value Ref Range    Color Yellow Yellow, Straw, Dark Yellow, Samira    Clarity, UA Clear Clear    Glucose, UA Negative Negative, 1000 mg/dL (3+) mg/dL    Bilirubin Negative Negative    Ketones, UA Negative Negative    Specific Gravity  1.020 1.005 - 1.030    Blood, UA Small (A) Negative    pH, Urine 7.0 5.0 - 8.0    Protein, POC Trace (A) Negative mg/dL    Urobilinogen, UA Normal Normal    Nitrite, UA Negative Negative    Leukocytes Small (1+) (A) Negative   Hydrophor microscopic evaluation patient is urine I saw 1 red blood cell and no obvious leukocytes or bacteria were seen.  Assessment and Plan    Diagnoses and all orders for this visit:    1. Urinary incontinence, unspecified type (Primary)  -     POC Urinalysis Dipstick, Multipro    Patient is an excellent response to Myrbetriq 25 mg she had a prescription sent in November she will continue Myrbetriq as directed if she should have any breakthrough symptoms or worsening symptoms she can always come in sooner otherwise she will follow-up in 1 year.

## 2022-01-10 ENCOUNTER — OFFICE VISIT (OUTPATIENT)
Dept: UROLOGY | Facility: CLINIC | Age: 77
End: 2022-01-10

## 2022-01-10 VITALS — WEIGHT: 141.6 LBS | BODY MASS INDEX: 22.22 KG/M2 | TEMPERATURE: 97.4 F | HEIGHT: 67 IN

## 2022-01-10 DIAGNOSIS — R32 URINARY INCONTINENCE, UNSPECIFIED TYPE: Primary | ICD-10-CM

## 2022-01-10 LAB
BILIRUB BLD-MCNC: NEGATIVE MG/DL
CLARITY, POC: CLEAR
COLOR UR: YELLOW
GLUCOSE UR STRIP-MCNC: NEGATIVE MG/DL
KETONES UR QL: NEGATIVE
LEUKOCYTE EST, POC: ABNORMAL
NITRITE UR-MCNC: NEGATIVE MG/ML
PH UR: 7 [PH] (ref 5–8)
PROT UR STRIP-MCNC: ABNORMAL MG/DL
RBC # UR STRIP: ABNORMAL /UL
SP GR UR: 1.02 (ref 1–1.03)
UROBILINOGEN UR QL: NORMAL

## 2022-01-10 PROCEDURE — 99213 OFFICE O/P EST LOW 20 MIN: CPT | Performed by: PHYSICIAN ASSISTANT

## 2022-01-10 PROCEDURE — 81001 URINALYSIS AUTO W/SCOPE: CPT | Performed by: PHYSICIAN ASSISTANT

## 2022-01-26 DIAGNOSIS — R06.09 DYSPNEA ON EXERTION: Primary | ICD-10-CM

## 2022-01-26 DIAGNOSIS — Z01.812 PRE-PROCEDURE LAB EXAM: Primary | ICD-10-CM

## 2022-01-26 NOTE — PROGRESS NOTES
Received Nuclear testing that was done at [place] on 10/19/2021 that showed inferior ischemia. Called and discussed with patient and granddaughter. Granddaughter reports that patient has been more fatigued, feeling like she needs to rest more throughout the day. Patients blood pressure has been better controlled running 130-140's /70-80's. Patient states that she has been more dyspneic with activities around the house. Granddaughter reports that she has had some worsening memory issues such as repeating things that she has already said. Granddaughter denies any difficulties remembering to take medications. She reports that she uses a pill box and it is filled for her weekly. Discussed options of medically managing with anti-anginal medication such as imdur vs doing a LHC. Patient reports that she would favor doing a LHC at this time.     Of note patient had cataract surgery earlier today and is scheduled to have the other eye done on 2/4/2022.     I will place orders for LHC at this time.

## 2022-01-29 ENCOUNTER — LAB (OUTPATIENT)
Dept: LAB | Facility: HOSPITAL | Age: 77
End: 2022-01-29

## 2022-01-29 DIAGNOSIS — Z01.812 PRE-PROCEDURE LAB EXAM: ICD-10-CM

## 2022-01-29 LAB — SARS-COV-2 ORF1AB RESP QL NAA+PROBE: NOT DETECTED

## 2022-01-29 PROCEDURE — U0004 COV-19 TEST NON-CDC HGH THRU: HCPCS

## 2022-01-29 PROCEDURE — C9803 HOPD COVID-19 SPEC COLLECT: HCPCS

## 2022-01-29 PROCEDURE — U0005 INFEC AGEN DETEC AMPLI PROBE: HCPCS

## 2022-02-01 ENCOUNTER — TELEPHONE (OUTPATIENT)
Dept: CARDIOLOGY | Facility: CLINIC | Age: 77
End: 2022-02-01

## 2022-02-01 ENCOUNTER — HOSPITAL ENCOUNTER (OUTPATIENT)
Facility: HOSPITAL | Age: 77
Discharge: HOME | End: 2022-02-01
Attending: INTERNAL MEDICINE | Admitting: INTERNAL MEDICINE

## 2022-02-01 VITALS
DIASTOLIC BLOOD PRESSURE: 63 MMHG | HEART RATE: 68 BPM | WEIGHT: 140 LBS | BODY MASS INDEX: 24.8 KG/M2 | OXYGEN SATURATION: 95 % | RESPIRATION RATE: 18 BRPM | HEIGHT: 63 IN | SYSTOLIC BLOOD PRESSURE: 130 MMHG | TEMPERATURE: 97.6 F

## 2022-02-01 DIAGNOSIS — R06.09 DYSPNEA ON EXERTION: ICD-10-CM

## 2022-02-01 LAB
ALBUMIN SERPL-MCNC: 4.2 G/DL (ref 3.5–5.2)
ALBUMIN/GLOB SERPL: 1.6 G/DL
ALP SERPL-CCNC: 81 U/L (ref 39–117)
ALT SERPL W P-5'-P-CCNC: 10 U/L (ref 1–33)
ANION GAP SERPL CALCULATED.3IONS-SCNC: 8 MMOL/L (ref 5–15)
AST SERPL-CCNC: 14 U/L (ref 1–32)
BILIRUB SERPL-MCNC: 0.5 MG/DL (ref 0–1.2)
BUN SERPL-MCNC: 24 MG/DL (ref 8–23)
BUN/CREAT SERPL: 31.2 (ref 7–25)
CALCIUM SPEC-SCNC: 9.4 MG/DL (ref 8.6–10.5)
CHLORIDE SERPL-SCNC: 106 MMOL/L (ref 98–107)
CO2 SERPL-SCNC: 29 MMOL/L (ref 22–29)
CREAT SERPL-MCNC: 0.77 MG/DL (ref 0.57–1)
DEPRECATED RDW RBC AUTO: 44.7 FL (ref 37–54)
ERYTHROCYTE [DISTWIDTH] IN BLOOD BY AUTOMATED COUNT: 13.2 % (ref 12.3–15.4)
GFR SERPL CREATININE-BSD FRML MDRD: 73 ML/MIN/1.73
GLOBULIN UR ELPH-MCNC: 2.7 GM/DL
GLUCOSE SERPL-MCNC: 104 MG/DL (ref 65–99)
HCT VFR BLD AUTO: 38.3 % (ref 34–46.6)
HGB BLD-MCNC: 12 G/DL (ref 12–15.9)
MCH RBC QN AUTO: 29 PG (ref 26.6–33)
MCHC RBC AUTO-ENTMCNC: 31.3 G/DL (ref 31.5–35.7)
MCV RBC AUTO: 92.5 FL (ref 79–97)
PLATELET # BLD AUTO: 197 10*3/MM3 (ref 140–450)
PMV BLD AUTO: 11.8 FL (ref 6–12)
POTASSIUM SERPL-SCNC: 4.1 MMOL/L (ref 3.5–5.2)
PROT SERPL-MCNC: 6.9 G/DL (ref 6–8.5)
RBC # BLD AUTO: 4.14 10*6/MM3 (ref 3.77–5.28)
SODIUM SERPL-SCNC: 143 MMOL/L (ref 136–145)
WBC NRBC COR # BLD: 4.72 10*3/MM3 (ref 3.4–10.8)

## 2022-02-01 PROCEDURE — 25010000002 FENTANYL CITRATE (PF) 100 MCG/2ML SOLUTION: Performed by: INTERNAL MEDICINE

## 2022-02-01 PROCEDURE — 99152 MOD SED SAME PHYS/QHP 5/>YRS: CPT | Performed by: INTERNAL MEDICINE

## 2022-02-01 PROCEDURE — 0 IOPAMIDOL PER 1 ML: Performed by: INTERNAL MEDICINE

## 2022-02-01 PROCEDURE — C1769 GUIDE WIRE: HCPCS | Performed by: INTERNAL MEDICINE

## 2022-02-01 PROCEDURE — 80053 COMPREHEN METABOLIC PANEL: CPT | Performed by: INTERNAL MEDICINE

## 2022-02-01 PROCEDURE — 25010000002 DIPHENHYDRAMINE PER 50 MG: Performed by: INTERNAL MEDICINE

## 2022-02-01 PROCEDURE — 25010000002 MIDAZOLAM PER 1 MG: Performed by: INTERNAL MEDICINE

## 2022-02-01 PROCEDURE — C1894 INTRO/SHEATH, NON-LASER: HCPCS | Performed by: INTERNAL MEDICINE

## 2022-02-01 PROCEDURE — 85027 COMPLETE CBC AUTOMATED: CPT | Performed by: INTERNAL MEDICINE

## 2022-02-01 PROCEDURE — 93459 L HRT ART/GRFT ANGIO: CPT | Performed by: INTERNAL MEDICINE

## 2022-02-01 PROCEDURE — 25010000002 HEPARIN (PORCINE) 1000-0.9 UT/500ML-% SOLUTION: Performed by: INTERNAL MEDICINE

## 2022-02-01 PROCEDURE — 25010000002 HEPARIN (PORCINE) 2000-0.9 UNIT/L-% SOLUTION: Performed by: INTERNAL MEDICINE

## 2022-02-01 PROCEDURE — S0260 H&P FOR SURGERY: HCPCS | Performed by: INTERNAL MEDICINE

## 2022-02-01 RX ORDER — SODIUM CHLORIDE 0.9 % (FLUSH) 0.9 %
10 SYRINGE (ML) INJECTION AS NEEDED
Status: DISCONTINUED | OUTPATIENT
Start: 2022-02-01 | End: 2022-02-01 | Stop reason: HOSPADM

## 2022-02-01 RX ORDER — PREDNISOLONE ACETATE 10 MG/ML
1 SUSPENSION/ DROPS OPHTHALMIC 3 TIMES DAILY
COMMUNITY
End: 2022-03-15

## 2022-02-01 RX ORDER — SODIUM CHLORIDE 0.9 % (FLUSH) 0.9 %
10 SYRINGE (ML) INJECTION EVERY 12 HOURS SCHEDULED
Status: DISCONTINUED | OUTPATIENT
Start: 2022-02-01 | End: 2022-02-01 | Stop reason: HOSPADM

## 2022-02-01 RX ORDER — HEPARIN SODIUM 200 [USP'U]/100ML
INJECTION, SOLUTION INTRAVENOUS AS NEEDED
Status: DISCONTINUED | OUTPATIENT
Start: 2022-02-01 | End: 2022-02-01 | Stop reason: HOSPADM

## 2022-02-01 RX ORDER — KETOROLAC TROMETHAMINE 5 MG/ML
SOLUTION OPHTHALMIC 3 TIMES DAILY
COMMUNITY
End: 2022-03-15

## 2022-02-01 RX ORDER — SODIUM CHLORIDE 9 MG/ML
1-3 INJECTION, SOLUTION INTRAVENOUS CONTINUOUS
Status: DISCONTINUED | OUTPATIENT
Start: 2022-02-01 | End: 2022-02-01 | Stop reason: HOSPADM

## 2022-02-01 RX ORDER — SODIUM CHLORIDE 0.9 % (FLUSH) 0.9 %
3 SYRINGE (ML) INJECTION EVERY 12 HOURS SCHEDULED
Status: DISCONTINUED | OUTPATIENT
Start: 2022-02-01 | End: 2022-02-01 | Stop reason: HOSPADM

## 2022-02-01 RX ORDER — ACETAMINOPHEN 325 MG/1
650 TABLET ORAL EVERY 4 HOURS PRN
Status: DISCONTINUED | OUTPATIENT
Start: 2022-02-01 | End: 2022-02-01 | Stop reason: HOSPADM

## 2022-02-01 RX ORDER — FENTANYL CITRATE 50 UG/ML
INJECTION, SOLUTION INTRAMUSCULAR; INTRAVENOUS AS NEEDED
Status: DISCONTINUED | OUTPATIENT
Start: 2022-02-01 | End: 2022-02-01 | Stop reason: HOSPADM

## 2022-02-01 RX ORDER — MIDAZOLAM HYDROCHLORIDE 1 MG/ML
INJECTION INTRAMUSCULAR; INTRAVENOUS AS NEEDED
Status: DISCONTINUED | OUTPATIENT
Start: 2022-02-01 | End: 2022-02-01 | Stop reason: HOSPADM

## 2022-02-01 RX ORDER — DIPHENHYDRAMINE HYDROCHLORIDE 50 MG/ML
INJECTION INTRAMUSCULAR; INTRAVENOUS AS NEEDED
Status: DISCONTINUED | OUTPATIENT
Start: 2022-02-01 | End: 2022-02-01 | Stop reason: HOSPADM

## 2022-02-01 RX ORDER — OFLOXACIN 3 MG/ML
1 SOLUTION/ DROPS OPHTHALMIC 3 TIMES DAILY
COMMUNITY
End: 2022-03-15

## 2022-02-01 RX ORDER — SODIUM CHLORIDE 9 MG/ML
100 INJECTION, SOLUTION INTRAVENOUS CONTINUOUS
Status: DISPENSED | OUTPATIENT
Start: 2022-02-01 | End: 2022-02-01

## 2022-02-01 RX ORDER — SODIUM CHLORIDE 9 MG/ML
250 INJECTION, SOLUTION INTRAVENOUS ONCE AS NEEDED
Status: DISCONTINUED | OUTPATIENT
Start: 2022-02-01 | End: 2022-02-01 | Stop reason: HOSPADM

## 2022-02-01 RX ORDER — LIDOCAINE HYDROCHLORIDE 20 MG/ML
INJECTION, SOLUTION INFILTRATION; PERINEURAL AS NEEDED
Status: DISCONTINUED | OUTPATIENT
Start: 2022-02-01 | End: 2022-02-01 | Stop reason: HOSPADM

## 2022-02-01 RX ADMIN — SODIUM CHLORIDE 2 ML/KG/HR: 9 INJECTION, SOLUTION INTRAVENOUS at 07:54

## 2022-02-01 NOTE — TELEPHONE ENCOUNTER
This pt is s/p cath today. She needs cardiac clearance for cataract surgery with Dr. Naseem Haas.

## 2022-02-01 NOTE — H&P
Chief Complaint: Dyspnea on exertion    HPI: Ms. Guerrero is a 76-year-old female with significant past medical history of coronary artery disease status post CABG in 2019, hypertension, hyperlipidemia, history of CVA, arthritis, COPD, depression, and cataracts.  She recently had 2 different stress test in October at [facility].  She initially had a stress echo that was low risk and this was followed by a nuclear stress test on 10/19/2021 that was read as inferior ischemia.  Due to her complaints of dyspnea on exertion and fatigue there was concern for anginal equivalent she has been set up for cardiac catheterization today.      Patient Active Problem List   Diagnosis   • Essential hypertension   • Mixed hyperlipidemia   • Stroke (HCC)   • Coronary artery disease involving coronary bypass graft of native heart without angina pectoris   • Dyspnea on exertion       Past Medical History:   Diagnosis Date   • Arthritis    • Asthma    • Cancer (HCC)     Female    • Cataract extraction status of eye, right 01/26/2022    cataract extration with lens inplant   • COPD (chronic obstructive pulmonary disease) (HCC)    • Coronary artery disease    • Depression    • Hearing loss    • Heart attack (HCC)    • Heart disease    • High blood pressure    • Hyperlipidemia    • Hypertension    • Hypertension    • Kidney stone    • Stroke (HCC)      Past Surgical History:   Procedure Laterality Date   • BACK SURGERY      40 Years Ago x2    • BLADDER SURGERY     • CARDIAC CATHETERIZATION     • CARDIAC SURGERY      3 years ago in [place] [facility]    • CHOLECYSTECTOMY     • CORONARY ARTERY BYPASS GRAFT  03/2020    DR LANIE OLMEDO X1    • HYSTERECTOMY     • INCONTINENCE SURGERY      x2  Dr Lindsay   • NECK SURGERY      30 years ago [facility]        The following portions of the patient's history were reviewed and updated as appropriate: allergies, current medications, past family history, past medical history, past  social history, past surgical history and problem list.    Social History     Socioeconomic History   • Marital status:    Tobacco Use   • Smoking status: Never Smoker   • Smokeless tobacco: Never Used   Vaping Use   • Vaping Use: Never used   Substance and Sexual Activity   • Alcohol use: No   • Drug use: No   • Sexual activity: Defer       Family History   Problem Relation Age of Onset   • COPD Father    • Lung disease Father    • Heart disease Father    • Heart disease Mother    • Cancer Mother    • Stroke Mother    • Heart disease Sister    • Breast cancer Sister    • Diabetes Brother    • COPD Son    • Lung cancer Son    • Cancer Daughter    • Hepatitis Daughter        Review of Systems: A 12 system review of systems was completed and is negative except stated in HPI.     Objective   BP (!) 182/82   Pulse 62   Resp 14   SpO2 (!) 2%     Physical Exam:  Constitutional:       Appearance: Well-developed and normal weight.   HENT:      Head: Normocephalic and atraumatic.   Pulmonary:      Effort: Pulmonary effort is normal.      Breath sounds: Normal breath sounds.   Cardiovascular:      Normal rate. Regular rhythm.      Murmurs: There is no murmur.      No gallop. No click.   Edema:     Peripheral edema absent.   Skin:     General: Skin is warm and dry.   Neurological:      Mental Status: Alert and oriented to person, place, and time.         Lab Results   Component Value Date    CKMB 0.51 08/03/2016     Lab Results   Component Value Date    GLUCOSE 104 (H) 02/01/2022    CALCIUM 9.4 02/01/2022     02/01/2022    K 4.1 02/01/2022    CO2 29.0 02/01/2022     02/01/2022    BUN 24 (H) 02/01/2022    CREATININE 0.77 02/01/2022    EGFRIFNONA 73 02/01/2022    BCR 31.2 (H) 02/01/2022    ANIONGAP 8.0 02/01/2022     Lab Results   Component Value Date    WBC 4.72 02/01/2022    HGB 12.0 02/01/2022    HCT 38.3 02/01/2022    MCV 92.5 02/01/2022     02/01/2022       -VIDA (1/5/2017) no significant  arterial insufficiency bilaterally  -CABG (2/2019) [place] at [facility]  -Echo (3/14/2019) EF 45-50%, grade 1 diastolic dysfunction, small pericardial effusion  -Holter Monitor (7/17/2019) rare PAC's and PVC's with 4 runs of nonsustained PAT up to 4 beats  -Echo (6/25/2020) EF 47%, hypokinetic septum, normal valves, normal LV size  -Carotid ultrasound (5/20/2021) mild less than 50% bilaterally, antegrade flow in both vertebral arteries  -BMP (9/7/2021) creatinine 0.78, potassium 3.5, sodium 140  -Lipid panel (9/7/2021) total cholesterol 173, HDL 46, LDL 74, triglycerides 260  -Dobutamine stress echo (10/19/2021) clinically and electrically negative, normal left ventricular contractility both at rest and during dobutamine infusion  -Nuclear stress (10/19/2021) inferior ischemia    Assessment:  1.  Coronary artery disease: History of CABG 2/2019.  CABG report not currently available.  2.  Abnormal nuclear stress: Inferior ischemia from 10/19/2021.  3.  Dyspnea on exertion  4.  Essential hypertension  5.  Hyperlipidemia  6.  Suspected sleep apnea      Plan:  -Cardiac catheterization with coronary and bypass graft angiography today.

## 2022-02-28 ENCOUNTER — OFFICE VISIT (OUTPATIENT)
Dept: CARDIOLOGY | Facility: CLINIC | Age: 77
End: 2022-02-28

## 2022-02-28 VITALS
SYSTOLIC BLOOD PRESSURE: 130 MMHG | BODY MASS INDEX: 25.2 KG/M2 | HEART RATE: 67 BPM | DIASTOLIC BLOOD PRESSURE: 70 MMHG | WEIGHT: 142.2 LBS | OXYGEN SATURATION: 98 % | HEIGHT: 63 IN

## 2022-02-28 DIAGNOSIS — E78.2 MIXED HYPERLIPIDEMIA: ICD-10-CM

## 2022-02-28 DIAGNOSIS — I25.810 CORONARY ARTERY DISEASE INVOLVING CORONARY BYPASS GRAFT OF NATIVE HEART WITHOUT ANGINA PECTORIS: Primary | ICD-10-CM

## 2022-02-28 DIAGNOSIS — I10 ESSENTIAL HYPERTENSION: ICD-10-CM

## 2022-02-28 DIAGNOSIS — R29.818 SUSPECTED SLEEP APNEA: ICD-10-CM

## 2022-02-28 DIAGNOSIS — R06.09 DYSPNEA ON EXERTION: ICD-10-CM

## 2022-02-28 PROCEDURE — 99214 OFFICE O/P EST MOD 30 MIN: CPT | Performed by: INTERNAL MEDICINE

## 2022-02-28 NOTE — PROGRESS NOTES
Reason for Visit: cardiovascular follow up.    HPI:  Felipa Guerrero is a 76 y.o. female is here today for follow-up.  She feels like she is doing OK.  Her breathing has improved somewhat she has not had any recent chest pain following her cardiac catheterization in January.  She denies any palpitations, dizziness, syncope, PND, or orthopnea.  Her family is concerned that her memory is decreasing.    Previous Cardiac Testing and Procedures:  -VIDA (1/5/2017) no significant arterial insufficiency bilaterally  -CABG (2/2019) [place] at [facility]  -Holter Monitor (7/17/2019) rare PAC's and PVC's with 4 runs of nonsustained PAT up to 4 beats  -Echo (6/25/2020) EF 47%, hypokinetic septum, normal valves, normal LV size  -Carotid ultrasound (5/20/2021) mild less than 50% bilaterally, antegrade flow in both vertebral arteries  -BMP (9/7/2021) creatinine 0.78, potassium 3.5, sodium 140  -Lipid panel (9/7/2021) total cholesterol 173, HDL 46, LDL 74, triglycerides 260  -Dobutamine stress echo (10/19/2021) clinically and electrically negative, normal left ventricular contractility both at rest and during dobutamine infusion  -Echo (12/27/2021) EF 61-65%, grade 1 diastolic dysfunction, normal RV size and function, no significant valve disease, normal RVSP  -LHC (1/26/2022) 80-90% stenosis of LCx with patent SVG to OM, otherwise mild nonobstructive disease with patent GREWAL to LAD    Patient Active Problem List   Diagnosis   • Essential hypertension   • Mixed hyperlipidemia   • Stroke (HCC)   • Coronary artery disease involving coronary bypass graft of native heart without angina pectoris   • Dyspnea on exertion       Social History     Tobacco Use   • Smoking status: Never Smoker   • Smokeless tobacco: Never Used   Vaping Use   • Vaping Use: Never used   Substance Use Topics   • Alcohol use: No   • Drug use: No       Family History   Problem Relation Age of Onset   • COPD Father    • Lung disease Father    • Heart disease Father     • Heart disease Mother    • Cancer Mother    • Stroke Mother    • Heart disease Sister    • Breast cancer Sister    • Diabetes Brother    • COPD Son    • Lung cancer Son    • Cancer Daughter    • Hepatitis Daughter        The following portions of the patient's history were reviewed and updated as appropriate: allergies, current medications, past family history, past medical history, past social history, past surgical history and problem list.      Current Outpatient Medications:   •  BREO ELLIPTA 100-25 MCG/INH aerosol powder , INHALE 1 PUFF PO QD, Disp: , Rfl: 2  •  carvedilol (COREG) 3.125 MG tablet, Take 3.125 mg by mouth 2 (Two) Times a Day With Meals., Disp: , Rfl:   •  citalopram (CeleXA) 20 MG tablet, , Disp: , Rfl:   •  clopidogrel (PLAVIX) 75 MG tablet, Take 75 mg by mouth Daily., Disp: , Rfl:   •  diazePAM (VALIUM) 5 MG tablet, TK 1 T PO BID, Disp: , Rfl: 2  •  hydroCHLOROthiazide (MICROZIDE) 12.5 MG capsule, Take 12.5 mg by mouth Daily., Disp: , Rfl:   •  HYDROcodone-acetaminophen (NORCO)  MG per tablet, TK 1 T PO TID, Disp: , Rfl: 0  •  ketorolac (ACULAR) 0.5 % ophthalmic solution, Administer  to both eyes 3 (Three) Times a Day., Disp: , Rfl:   •  losartan (COZAAR) 50 MG tablet, Take 1.5 tablets by mouth Daily., Disp: 45 tablet, Rfl: 11  •  meclizine (ANTIVERT) 25 MG tablet, Take 25 mg by mouth 3 (Three) Times a Day As Needed for Dizziness., Disp: , Rfl:   •  Mirabegron ER (Myrbetriq) 25 MG tablet sustained-release 24 hour 24 hr tablet, Take 1 tablet by mouth Daily., Disp: 30 tablet, Rfl: 11  •  ofloxacin (OCUFLOX) 0.3 % ophthalmic solution, Administer 1 drop to the right eye 3 (Three) Times a Day., Disp: , Rfl:   •  pantoprazole (PROTONIX) 40 MG EC tablet, Take 40 mg by mouth Daily., Disp: , Rfl:   •  prednisoLONE acetate (PRED FORTE) 1 % ophthalmic suspension, Administer 1 drop to the right eye 3 (Three) Times a Day., Disp: , Rfl:   •  rosuvastatin (CRESTOR) 20 MG tablet, Take  by mouth  "Daily., Disp: , Rfl: 2  •  tiZANidine (ZANAFLEX) 4 MG tablet, TK 1 T PO BID, Disp: , Rfl: 2    Review of Systems   Constitutional: Negative for chills and fever.   Cardiovascular: Positive for dyspnea on exertion. Negative for chest pain and paroxysmal nocturnal dyspnea.   Respiratory: Negative for cough and shortness of breath.    Skin: Negative for rash.   Gastrointestinal: Negative for abdominal pain and heartburn.   Neurological: Negative for dizziness and numbness.       Objective   /70 (BP Location: Left arm, Patient Position: Sitting, Cuff Size: Adult)   Pulse 67   Ht 160 cm (62.99\")   Wt 64.5 kg (142 lb 3.2 oz)   SpO2 98%   BMI 25.20 kg/m²   Constitutional:       Appearance: Well-developed.   HENT:      Head: Normocephalic and atraumatic.   Pulmonary:      Effort: Pulmonary effort is normal.      Breath sounds: Normal breath sounds.   Cardiovascular:      Normal rate. Regular rhythm.      Murmurs: There is no murmur.      No gallop. No click.   Edema:     Peripheral edema absent.   Skin:     General: Skin is warm and dry.   Neurological:      Mental Status: Alert and oriented to person, place, and time.       Procedures      ICD-10-CM ICD-9-CM   1. Coronary artery disease involving coronary bypass graft of native heart without angina pectoris  I25.810 414.05   2. Dyspnea on exertion  R06.00 786.09   3. Essential hypertension  I10 401.9   4. Mixed hyperlipidemia  E78.2 272.2   5. Suspected sleep apnea  R29.818 781.99         Assessment/Plan:  1.  Coronary artery disease: History of CABG in 2019.  LHC from 1/26/2022 showed single-vessel disease of LCx with patent bypass grafts.   Nuclear stress test felt to be a false positive.  Continue Plavix, carvedilol, and rosuvastatin.     2.  Dyspnea on exertion: Echo from 12/27/2021 showed normal LV systolic function with grade 1 diastolic dysfunction and otherwise unremarkable.  LHC showed single-vessel disease with patent bypass grafts.     3.  Essential " hypertension: Blood pressure is acceptable today.  Continue carvedilol, losartan, and HCTZ.     4.  Mixed hyperlipidemia: Lipid panel from 9/7/2021 was borderline with elevated triglycerides and acceptable cholesterol.  Continue rosuvastatin and plan to discuss repeat lipid panel at follow-up.     5.  Suspected sleep apnea: Patient previously referred to sleep medicine clinic for evaluation of possible sleep apnea.

## 2022-03-15 ENCOUNTER — OFFICE VISIT (OUTPATIENT)
Dept: NEUROLOGY | Facility: CLINIC | Age: 77
End: 2022-03-15

## 2022-03-15 VITALS
WEIGHT: 140 LBS | HEIGHT: 63 IN | HEART RATE: 68 BPM | BODY MASS INDEX: 24.8 KG/M2 | RESPIRATION RATE: 18 BRPM | DIASTOLIC BLOOD PRESSURE: 62 MMHG | SYSTOLIC BLOOD PRESSURE: 110 MMHG

## 2022-03-15 DIAGNOSIS — G47.9 SLEEP DISTURBANCE: Primary | ICD-10-CM

## 2022-03-15 DIAGNOSIS — R06.83 SNORING: ICD-10-CM

## 2022-03-15 PROCEDURE — 99203 OFFICE O/P NEW LOW 30 MIN: CPT | Performed by: PSYCHIATRY & NEUROLOGY

## 2022-03-15 NOTE — PROGRESS NOTES
HISTORY: New patient work-up for sleep clinic for fatigue.  Patient says she wakes up often does snore and dream wakes up suddenly scared at times.  Patient feels tired during the day.  Patient has previous heart problems.  Patient follows with cardiology with prior CABG 3 years ago.  Also has apparently diagnosed with COPD.  Patient has been followed by vascular surgery with CT angiography showing high-grade stenosis at origin of bilateral vertebral arteries.  There is extensive soft plaque without ulceration left carotid bulb resulting about 20% stenosis.  Bilateral calcified nonstenotic atherosclerosis carotid bifurcations.  Also advanced nonstenotic calcific atherosclerosis of the aorta.  Patient's MRI done May 2021 shows chronic lacunar infarct right internal capsule, left frontal parietal region and right thalamus.  Patient is on Plavix and statin.  Patient has a STOP-BANG of 4 and [place] Sleepiness Scale of 7.  Patient has problems with excessive daytime sleepiness.  Patient has loud snoring.  Patient does awaken with dry mouth and has awaken gasping for breath.  Patient is waking coughing, choking, respiratory discomfort.  Patient often has discomfort in her legs with urge to move them.  Patient has frequent awakenings and generally restless sleep.  Patient acts out her dreams and excessive sweating during sleep.  Patient grinds her teeth and wets the bed at night.  Patient goes to bed at 8 PM awakens at 7:30 AM.  Patient takes 30 minutes to an hour to fall asleep.     EXAM: Blood pressure today 118/70 left arm seated 110/62 left arm standing with pulse 68.  No acute oropharynx abnormalities.  Mallampati 2.  No acute cardiopulmonary abnormalities by auscultation.  No acute fundic abnormalities.  Tympanic membranes obscured by wax bilaterally.  No bruit/no lymphadenopathy.  Neck circumference is 14 inches.  BMI is 24.8.  Abdomen nondistended with no liver edge or spleen edge felt.    IMPRESSION: Patient with  excessive daytime sleepiness and fatigue.  Patient is to get overnight continuous oximetry on room air and probably in lab sleep study.  I spent 30 minutes with this patient with counseling exam and review of records.  BMI in normal range for age.  Patient to continue driving and safety precautions

## 2022-04-13 ENCOUNTER — HOSPITAL ENCOUNTER (OUTPATIENT)
Dept: SLEEP MEDICINE | Facility: HOSPITAL | Age: 77
Discharge: HOME | End: 2022-04-13
Admitting: PSYCHIATRY & NEUROLOGY

## 2022-04-13 VITALS
SYSTOLIC BLOOD PRESSURE: 150 MMHG | HEIGHT: 63 IN | DIASTOLIC BLOOD PRESSURE: 80 MMHG | WEIGHT: 140 LBS | RESPIRATION RATE: 14 BRPM | BODY MASS INDEX: 24.8 KG/M2 | HEART RATE: 63 BPM

## 2022-04-13 DIAGNOSIS — R06.83 SNORING: ICD-10-CM

## 2022-04-13 DIAGNOSIS — G47.9 SLEEP DISTURBANCE: ICD-10-CM

## 2022-04-13 PROCEDURE — 95810 POLYSOM 6/> YRS 4/> PARAM: CPT | Performed by: PSYCHIATRY & NEUROLOGY

## 2022-04-13 PROCEDURE — 95810 POLYSOM 6/> YRS 4/> PARAM: CPT

## 2022-04-18 DIAGNOSIS — J44.9 CHRONIC OBSTRUCTIVE PULMONARY DISEASE, UNSPECIFIED COPD TYPE: ICD-10-CM

## 2022-04-18 DIAGNOSIS — R09.02 HYPOXIA: Primary | ICD-10-CM

## 2022-04-19 ENCOUNTER — APPOINTMENT (OUTPATIENT)
Dept: GENERAL RADIOLOGY | Facility: HOSPITAL | Age: 77
End: 2022-04-19

## 2022-04-19 ENCOUNTER — HOSPITAL ENCOUNTER (EMERGENCY)
Facility: HOSPITAL | Age: 77
Discharge: HOME | End: 2022-04-19
Admitting: EMERGENCY MEDICINE

## 2022-04-19 VITALS
SYSTOLIC BLOOD PRESSURE: 159 MMHG | OXYGEN SATURATION: 99 % | HEART RATE: 63 BPM | RESPIRATION RATE: 16 BRPM | HEIGHT: 67 IN | BODY MASS INDEX: 21.19 KG/M2 | TEMPERATURE: 98 F | WEIGHT: 135 LBS | DIASTOLIC BLOOD PRESSURE: 75 MMHG

## 2022-04-19 DIAGNOSIS — R07.9 CHEST PAIN, UNSPECIFIED TYPE: Primary | ICD-10-CM

## 2022-04-19 LAB
ALBUMIN SERPL-MCNC: 4.4 G/DL (ref 3.5–5.2)
ALBUMIN/GLOB SERPL: 1.6 G/DL
ALP SERPL-CCNC: 109 U/L (ref 39–117)
ALT SERPL W P-5'-P-CCNC: 12 U/L (ref 1–33)
ANION GAP SERPL CALCULATED.3IONS-SCNC: 10 MMOL/L (ref 5–15)
AST SERPL-CCNC: 21 U/L (ref 1–32)
BASOPHILS # BLD AUTO: 0.04 10*3/MM3 (ref 0–0.2)
BASOPHILS NFR BLD AUTO: 0.7 % (ref 0–1.5)
BILIRUB SERPL-MCNC: 0.5 MG/DL (ref 0–1.2)
BUN SERPL-MCNC: 22 MG/DL (ref 8–23)
BUN/CREAT SERPL: 32.8 (ref 7–25)
CALCIUM SPEC-SCNC: 9.9 MG/DL (ref 8.6–10.5)
CHLORIDE SERPL-SCNC: 104 MMOL/L (ref 98–107)
CO2 SERPL-SCNC: 27 MMOL/L (ref 22–29)
CREAT SERPL-MCNC: 0.67 MG/DL (ref 0.57–1)
D DIMER PPP FEU-MCNC: 0.59 MG/L (FEU) (ref 0–0.5)
DEPRECATED RDW RBC AUTO: 44.9 FL (ref 37–54)
EGFRCR SERPLBLD CKD-EPI 2021: 90.2 ML/MIN/1.73
EOSINOPHIL # BLD AUTO: 0.15 10*3/MM3 (ref 0–0.4)
EOSINOPHIL NFR BLD AUTO: 2.6 % (ref 0.3–6.2)
ERYTHROCYTE [DISTWIDTH] IN BLOOD BY AUTOMATED COUNT: 13.1 % (ref 12.3–15.4)
GLOBULIN UR ELPH-MCNC: 2.7 GM/DL
GLUCOSE SERPL-MCNC: 96 MG/DL (ref 65–99)
HCT VFR BLD AUTO: 37.5 % (ref 34–46.6)
HGB BLD-MCNC: 12.2 G/DL (ref 12–15.9)
HOLD SPECIMEN: NORMAL
HOLD SPECIMEN: NORMAL
IMM GRANULOCYTES # BLD AUTO: 0.02 10*3/MM3 (ref 0–0.05)
IMM GRANULOCYTES NFR BLD AUTO: 0.4 % (ref 0–0.5)
LIPASE SERPL-CCNC: 49 U/L (ref 13–60)
LYMPHOCYTES # BLD AUTO: 1.87 10*3/MM3 (ref 0.7–3.1)
LYMPHOCYTES NFR BLD AUTO: 32.9 % (ref 19.6–45.3)
MCH RBC QN AUTO: 30.4 PG (ref 26.6–33)
MCHC RBC AUTO-ENTMCNC: 32.5 G/DL (ref 31.5–35.7)
MCV RBC AUTO: 93.5 FL (ref 79–97)
MONOCYTES # BLD AUTO: 0.54 10*3/MM3 (ref 0.1–0.9)
MONOCYTES NFR BLD AUTO: 9.5 % (ref 5–12)
NEUTROPHILS NFR BLD AUTO: 3.07 10*3/MM3 (ref 1.7–7)
NEUTROPHILS NFR BLD AUTO: 53.9 % (ref 42.7–76)
NRBC BLD AUTO-RTO: 0 /100 WBC (ref 0–0.2)
PLATELET # BLD AUTO: 219 10*3/MM3 (ref 140–450)
PMV BLD AUTO: 11.9 FL (ref 6–12)
POTASSIUM SERPL-SCNC: 3.5 MMOL/L (ref 3.5–5.2)
PROT SERPL-MCNC: 7.1 G/DL (ref 6–8.5)
RBC # BLD AUTO: 4.01 10*6/MM3 (ref 3.77–5.28)
SODIUM SERPL-SCNC: 141 MMOL/L (ref 136–145)
TROPONIN T SERPL-MCNC: <0.01 NG/ML (ref 0–0.03)
TROPONIN T SERPL-MCNC: <0.01 NG/ML (ref 0–0.03)
WBC NRBC COR # BLD: 5.69 10*3/MM3 (ref 3.4–10.8)
WHOLE BLOOD HOLD SPECIMEN: NORMAL
WHOLE BLOOD HOLD SPECIMEN: NORMAL

## 2022-04-19 PROCEDURE — 84484 ASSAY OF TROPONIN QUANT: CPT

## 2022-04-19 PROCEDURE — 85025 COMPLETE CBC W/AUTO DIFF WBC: CPT

## 2022-04-19 PROCEDURE — 93005 ELECTROCARDIOGRAM TRACING: CPT

## 2022-04-19 PROCEDURE — 36415 COLL VENOUS BLD VENIPUNCTURE: CPT

## 2022-04-19 PROCEDURE — 99284 EMERGENCY DEPT VISIT MOD MDM: CPT

## 2022-04-19 PROCEDURE — 93010 ELECTROCARDIOGRAM REPORT: CPT | Performed by: INTERNAL MEDICINE

## 2022-04-19 PROCEDURE — 80053 COMPREHEN METABOLIC PANEL: CPT

## 2022-04-19 PROCEDURE — 83690 ASSAY OF LIPASE: CPT | Performed by: NURSE PRACTITIONER

## 2022-04-19 PROCEDURE — 85379 FIBRIN DEGRADATION QUANT: CPT | Performed by: NURSE PRACTITIONER

## 2022-04-19 PROCEDURE — 71045 X-RAY EXAM CHEST 1 VIEW: CPT

## 2022-04-19 RX ORDER — ASPIRIN 81 MG/1
324 TABLET, CHEWABLE ORAL ONCE
Status: COMPLETED | OUTPATIENT
Start: 2022-04-19 | End: 2022-04-19

## 2022-04-19 RX ORDER — ISOSORBIDE MONONITRATE 30 MG/1
30 TABLET, EXTENDED RELEASE ORAL DAILY
Qty: 30 TABLET | Refills: 0 | Status: SHIPPED | OUTPATIENT
Start: 2022-04-19

## 2022-04-19 RX ORDER — SODIUM CHLORIDE 0.9 % (FLUSH) 0.9 %
10 SYRINGE (ML) INJECTION AS NEEDED
Status: DISCONTINUED | OUTPATIENT
Start: 2022-04-19 | End: 2022-04-19 | Stop reason: HOSPADM

## 2022-04-19 RX ORDER — CLONIDINE HYDROCHLORIDE 0.1 MG/1
0.1 TABLET ORAL ONCE
Status: COMPLETED | OUTPATIENT
Start: 2022-04-19 | End: 2022-04-19

## 2022-04-19 RX ADMIN — ASPIRIN 324 MG: 81 TABLET, CHEWABLE ORAL at 15:18

## 2022-04-19 RX ADMIN — CLONIDINE HYDROCHLORIDE 0.1 MG: 0.1 TABLET ORAL at 16:30

## 2022-04-19 NOTE — ED PROVIDER NOTES
Subjective   Patient is a 76-year-old female presents the ER with complaints of chest pain.  She states she began experiencing chest pain last night described as indigestion to her substernal region.  She has taken Tums throughout the day for this issue.  Patient reports mild shortness of breath and nausea and has had a few episodes of dry heaving.  She denies any current chest pain at this time.  Past medical history significant for arthritis, asthma, COPD, coronary artery disease, depression, MI, hypertension, kidney stones, stroke.  Patient tells me she had a CABG 3 to 4 years ago by CT surgeon in [place].  Patient had a heart catheterization performed by Dr. Saleem with cardiology on February 1, 2022.  Indication was for coronary artery disease with stable angina and abnormal nuclear stress.  Findings revealed single-vessel disease with severe stenosis of the left circumflex with otherwise mild nonobstructive disease, patent LIMA to LAD and SVG to OM.  False positive nuclear stress.  Plan was for routine post cath care.          Review of Systems   Constitutional: Negative.  Negative for fever.   HENT: Negative.  Negative for congestion.    Eyes: Negative.    Respiratory: Positive for shortness of breath. Negative for cough.    Cardiovascular: Positive for chest pain.   Gastrointestinal: Positive for nausea and vomiting. Negative for abdominal pain.   Genitourinary: Negative.    Musculoskeletal: Negative.    All other systems reviewed and are negative.      Past Medical History:   Diagnosis Date   • Arthritis    • Asthma    • Cancer (HCC)     Female    • Cataract extraction status of eye, right 01/26/2022    cataract extration with lens inplant   • COPD (chronic obstructive pulmonary disease) (HCC)    • Coronary artery disease    • Depression    • Hearing loss    • Heart attack (HCC)    • Heart disease    • High blood pressure    • Hyperlipidemia    • Hypertension    • Hypertension    • Kidney stone    • Stroke  (HCC)        No Known Allergies    Past Surgical History:   Procedure Laterality Date   • BACK SURGERY      40 Years Ago x2    • BLADDER SURGERY     • CARDIAC CATHETERIZATION     • CARDIAC CATHETERIZATION N/A 2/1/2022    Procedure: Left Heart Cath;  Surgeon: Angelo Saleem MD;  Location: [facility] CATH INVASIVE LOCATION;  Service: Cardiology;  Laterality: N/A;   • CARDIAC SURGERY      3 years ago in [facility]    • CHOLECYSTECTOMY     • CORONARY ARTERY BYPASS GRAFT  03/2020    DR SON LE X1    • EYE SURGERY Right 01/26/2022   • HYSTERECTOMY     • INCONTINENCE SURGERY      x2  Dr Lindsay   • NECK SURGERY      30 years ago [place]        Family History   Problem Relation Age of Onset   • COPD Father    • Lung disease Father    • Heart disease Father    • Heart disease Mother    • Cancer Mother    • Stroke Mother    • Heart disease Sister    • Breast cancer Sister    • Diabetes Brother    • COPD Son    • Lung cancer Son    • Cancer Daughter    • Hepatitis Daughter        Social History     Socioeconomic History   • Marital status:    Tobacco Use   • Smoking status: Never Smoker   • Smokeless tobacco: Never Used   Vaping Use   • Vaping Use: Never used   Substance and Sexual Activity   • Alcohol use: No   • Drug use: No   • Sexual activity: Defer           Objective   Physical Exam  Vitals and nursing note reviewed.   Constitutional:       Appearance: She is well-developed.   HENT:      Head: Normocephalic and atraumatic.      Nose: Nose normal.   Eyes:      Conjunctiva/sclera: Conjunctivae normal.      Pupils: Pupils are equal, round, and reactive to light.   Cardiovascular:      Rate and Rhythm: Normal rate and regular rhythm.      Heart sounds: Normal heart sounds.   Pulmonary:      Effort: Pulmonary effort is normal.      Breath sounds: Normal breath sounds.   Abdominal:      General: Bowel sounds are normal.      Palpations: Abdomen is soft.   Musculoskeletal:         General: Normal  range of motion.      Cervical back: Normal range of motion and neck supple.   Skin:     General: Skin is warm and dry.   Neurological:      Mental Status: She is alert and oriented to person, place, and time.   Psychiatric:         Behavior: Behavior normal.         Procedures           ED Course   Labs Reviewed   COMPREHENSIVE METABOLIC PANEL - Abnormal; Notable for the following components:       Result Value    BUN/Creatinine Ratio 32.8 (*)     All other components within normal limits    Narrative:     GFR Normal >60  Chronic Kidney Disease <60  Kidney Failure <15     D-DIMER, QUANTITATIVE - Abnormal; Notable for the following components:    D-Dimer, Quantitative 0.59 (*)     All other components within normal limits    Narrative:     Reference Range is 0-0.50 mg/L FEU. However, results <0.50 mg/L FEU tends to rule out DVT or PE. Results >0.50 mg/L FEU are not useful in predicting absence or presence of DVT or PE.     TROPONIN (IN-HOUSE) - Normal    Narrative:     Troponin T Reference Range:  <= 0.03 ng/mL-   Negative for AMI  >0.03 ng/mL-     Abnormal for myocardial necrosis.  Clinicians would have to utilize clinical acumen, EKG, Troponin and serial changes to determine if it is an Acute Myocardial Infarction or myocardial injury due to an underlying chronic condition.       Results may be falsely decreased if patient taking Biotin.     TROPONIN (IN-HOUSE) - Normal    Narrative:     Troponin T Reference Range:  <= 0.03 ng/mL-   Negative for AMI  >0.03 ng/mL-     Abnormal for myocardial necrosis.  Clinicians would have to utilize clinical acumen, EKG, Troponin and serial changes to determine if it is an Acute Myocardial Infarction or myocardial injury due to an underlying chronic condition.       Results may be falsely decreased if patient taking Biotin.     CBC WITH AUTO DIFFERENTIAL - Normal   LIPASE - Normal   RAINBOW DRAW    Narrative:     The following orders were created for panel order [place]  Draw.  Procedure                               Abnormality         Status                     ---------                               -----------         ------                     Green Top (Gel)[[id number]]                                  Final result               Lavender Top[[id number]]                                     Final result               Red Top[[id number]]                                          Final result               Light Blue Top[[id number]]                                   Final result                 Please view results for these tests on the individual orders.   CBC AND DIFFERENTIAL    Narrative:     The following orders were created for panel order CBC & Differential.  Procedure                               Abnormality         Status                     ---------                               -----------         ------                     CBC Auto Differential[[id number]]        Normal              Final result                 Please view results for these tests on the individual orders.   GREEN TOP   LAVENDER TOP   RED TOP   LIGHT BLUE TOP     ED Course as of 04/20/22 0059   Tue Apr 19, 2022   1602 Discussed case with Dr. Fontana. Patient had 2 sets, labs otherwise negative. D dimer age adjusted is within normal limits.  [TW]   1626 Xray negative. She had a cardiac cath 2/1/22 that revealed false positive nuclear stress. Patent LIMA to LAD and SVG to OM. Single vessel disease with severe stenosis of left circumflex with otherwise mild nonobstructive disease. He recommends Imdur 30 mg to see if this helps with her chest discomfort and f/u with Dr. Saleem. [TW]      ED Course User Index  [TW] Teagan Santos APRN                                               HEART Score (for prediction of 6-week risk of major adverse cardiac event) reviewed and/or performed as part of the patient evaluation and treatment planning process.  The result associated with this review/performance is: 4        MDM  Number of Diagnoses or Management Options  Chest pain, unspecified type: new and requires workup     Amount and/or Complexity of Data Reviewed  Clinical lab tests: ordered and reviewed  Tests in the radiology section of CPT®: ordered and reviewed  Decide to obtain previous medical records or to obtain history from someone other than the patient: yes  Discuss the patient with other providers: yes    Risk of Complications, Morbidity, and/or Mortality  Presenting problems: moderate  Diagnostic procedures: moderate  Management options: moderate    Patient Progress  Patient progress: improved      Final diagnoses:   Chest pain, unspecified type       ED Disposition  ED Disposition     ED Disposition   Discharge    Condition   Good    Comment   --             No follow-up provider specified.       Medication List      New Prescriptions    isosorbide mononitrate 30 MG 24 hr tablet  Commonly known as: IMDUR  Take 1 tablet by mouth Daily.           Where to Get Your Medications      These medications were sent to [id number] - [place], [place] - [address] [place] [address] AT [address] & [facility] - [phone]  - [phone] FX  [address] [place] [id number]    Phone: [phone]   · isosorbide mononitrate 30 MG 24 hr tablet          Teagan Santos, APRN  04/20/22 0100

## 2022-04-20 LAB
QT INTERVAL: 420 MS
QT INTERVAL: 428 MS
QTC INTERVAL: 405 MS
QTC INTERVAL: 408 MS

## 2022-04-22 ENCOUNTER — OFFICE VISIT (OUTPATIENT)
Dept: CARDIOLOGY | Facility: CLINIC | Age: 77
End: 2022-04-22

## 2022-04-22 VITALS
HEIGHT: 67 IN | HEART RATE: 63 BPM | BODY MASS INDEX: 20.56 KG/M2 | DIASTOLIC BLOOD PRESSURE: 70 MMHG | WEIGHT: 131 LBS | SYSTOLIC BLOOD PRESSURE: 136 MMHG

## 2022-04-22 DIAGNOSIS — I10 ESSENTIAL HYPERTENSION: ICD-10-CM

## 2022-04-22 DIAGNOSIS — E78.2 MIXED HYPERLIPIDEMIA: ICD-10-CM

## 2022-04-22 DIAGNOSIS — R07.89 OTHER CHEST PAIN: ICD-10-CM

## 2022-04-22 DIAGNOSIS — I25.810 CORONARY ARTERY DISEASE INVOLVING CORONARY BYPASS GRAFT OF NATIVE HEART WITHOUT ANGINA PECTORIS: Primary | ICD-10-CM

## 2022-04-22 DIAGNOSIS — G47.34 NOCTURNAL HYPOXIA: ICD-10-CM

## 2022-04-22 PROCEDURE — 99214 OFFICE O/P EST MOD 30 MIN: CPT | Performed by: INTERNAL MEDICINE

## 2022-04-22 RX ORDER — KETOROLAC TROMETHAMINE 5 MG/ML
SOLUTION OPHTHALMIC
COMMUNITY
Start: 2022-04-11

## 2022-04-22 RX ORDER — OFLOXACIN 3 MG/ML
SOLUTION/ DROPS OPHTHALMIC
COMMUNITY
Start: 2022-04-11

## 2022-04-22 RX ORDER — PREDNISOLONE ACETATE 10 MG/ML
SUSPENSION/ DROPS OPHTHALMIC
COMMUNITY
Start: 2022-04-11

## 2022-04-22 RX ORDER — LOSARTAN POTASSIUM 100 MG/1
100 TABLET ORAL DAILY
Qty: 30 TABLET | Refills: 11 | Status: SHIPPED | OUTPATIENT
Start: 2022-04-22

## 2022-04-22 NOTE — PROGRESS NOTES
Reason for Visit: cardiovascular follow up.    HPI:  Felipa Guerrero is a 76 y.o. female is here today for follow-up.  She recently went to the emergency room on 4/19/2022 with chest pain.  She feels like it was related to indigestion and had to take a lot of Tums.  Troponin was within normal limits and EKG did not show any acute changes.  She recently had a cardiac catheterization on 1/26/2022.  This showed severe stenosis of the LCx but with patent vein graft to the LCx distribution.  LIMA to the LAD was also patent.  Medical therapy was recommended after the heart cath.  She was started on Imdur and discharged from the ER.  She is concerned that there may be a stomach ulcer.  Food and drink might make it worse.  She reports this has been going on for years and getting worse recently.  Her blood pressure has been running high at home.      Previous Cardiac Testing and Procedures:  -VIDA (1/5/2017) no significant arterial insufficiency bilaterally  -CABG (2/2019) [place] [facility]  -Holter Monitor (7/17/2019) rare PAC's and PVC's with 4 runs of nonsustained PAT up to 4 beats  -Echo (6/25/2020) EF 47%, hypokinetic septum, normal valves, normal LV size  -Carotid ultrasound (5/20/2021) mild less than 50% bilaterally, antegrade flow in both vertebral arteries  -BMP (9/7/2021) creatinine 0.78, potassium 3.5, sodium 140  -Lipid panel (9/7/2021) total cholesterol 173, HDL 46, LDL 74, triglycerides 260  -Dobutamine stress echo (10/19/2021) clinically and electrically negative, normal left ventricular contractility both at rest and during dobutamine infusion  -Echo (12/27/2021) EF 61-65%, grade 1 diastolic dysfunction, normal RV size and function, no significant valve disease, normal RVSP  -LHC (1/26/2022) 80-90% stenosis of LCx with patent SVG to OM, otherwise mild nonobstructive disease with patent LIMA to LAD  -Polysomnography (4/17/2022) no evidence of sleep apnea, poor sleep efficiency, hypoxia noted    Patient Active  Problem List   Diagnosis   • Essential hypertension   • Mixed hyperlipidemia   • Stroke (HCC)   • Coronary artery disease involving coronary bypass graft of native heart without angina pectoris   • Dyspnea on exertion       Social History     Tobacco Use   • Smoking status: Never Smoker   • Smokeless tobacco: Never Used   Vaping Use   • Vaping Use: Never used   Substance Use Topics   • Alcohol use: No   • Drug use: No       Family History   Problem Relation Age of Onset   • COPD Father    • Lung disease Father    • Heart disease Father    • Heart disease Mother    • Cancer Mother    • Stroke Mother    • Heart disease Sister    • Breast cancer Sister    • Diabetes Brother    • COPD Son    • Lung cancer Son    • Cancer Daughter    • Hepatitis Daughter        The following portions of the patient's history were reviewed and updated as appropriate: allergies, current medications, past family history, past medical history, past social history, past surgical history and problem list.      Current Outpatient Medications:   •  ASPIRIN 81 PO, Take  by mouth., Disp: , Rfl:   •  BREO ELLIPTA 100-25 MCG/INH aerosol powder , INHALE 1 PUFF PO QD, Disp: , Rfl: 2  •  carvedilol (COREG) 3.125 MG tablet, Take 3.125 mg by mouth 2 (Two) Times a Day With Meals., Disp: , Rfl:   •  citalopram (CeleXA) 20 MG tablet, , Disp: , Rfl:   •  clopidogrel (PLAVIX) 75 MG tablet, Take 75 mg by mouth Daily., Disp: , Rfl:   •  diazePAM (VALIUM) 5 MG tablet, TK 1 T PO BID, Disp: , Rfl: 2  •  hydroCHLOROthiazide (MICROZIDE) 12.5 MG capsule, Take 12.5 mg by mouth Daily., Disp: , Rfl:   •  HYDROcodone-acetaminophen (NORCO)  MG per tablet, TK 1 T PO TID, Disp: , Rfl: 0  •  isosorbide mononitrate (IMDUR) 30 MG 24 hr tablet, Take 1 tablet by mouth Daily., Disp: 30 tablet, Rfl: 0  •  ketorolac (ACULAR) 0.5 % ophthalmic solution, INSTILL 1 DROP INTO THE SURGICAL EYE THREE TIMES DAILY. START 2 DAYS BEFORE SURGERY THEN CONTINUE THREE TIMES DAILY UNTIL  "GONE, Disp: , Rfl:   •  losartan (COZAAR) 100 MG tablet, Take 1 tablet by mouth Daily., Disp: 30 tablet, Rfl: 11  •  meclizine (ANTIVERT) 25 MG tablet, Take 25 mg by mouth 3 (Three) Times a Day As Needed for Dizziness., Disp: , Rfl:   •  Mirabegron ER (Myrbetriq) 25 MG tablet sustained-release 24 hour 24 hr tablet, Take 1 tablet by mouth Daily., Disp: 30 tablet, Rfl: 11  •  ofloxacin (OCUFLOX) 0.3 % ophthalmic solution, INSTILL 1 DROP TO SURGICAL EYE THREE TIMES DAILY. START 2 DAYS BEFORE SURGERY-USE UNTIL GONE, Disp: , Rfl:   •  pantoprazole (PROTONIX) 40 MG EC tablet, Take 40 mg by mouth Daily., Disp: , Rfl:   •  prednisoLONE acetate (PRED FORTE) 1 % ophthalmic suspension, INSTILL 1 DROP INTO THE SURGICAL EYE THREE TIMES DAILY. START 2 DAYS PRIOR TO SURGERY THEN CONTINUE THREE TIMES DAILY UNTIL GONE, Disp: , Rfl:   •  rosuvastatin (CRESTOR) 20 MG tablet, Take  by mouth Daily., Disp: , Rfl: 2  •  tiZANidine (ZANAFLEX) 4 MG tablet, TK 1 T PO BID, Disp: , Rfl: 2    Review of Systems   Constitutional: Negative for chills and fever.   Cardiovascular: Positive for chest pain. Negative for paroxysmal nocturnal dyspnea.   Respiratory: Negative for cough and shortness of breath.    Skin: Negative for rash.   Gastrointestinal: Positive for abdominal pain and heartburn.   Neurological: Negative for dizziness and numbness.       Objective   /70   Pulse 63   Ht 170.2 cm (67\")   Wt 59.4 kg (131 lb)   BMI 20.52 kg/m²   Constitutional:       Appearance: Well-developed.   HENT:      Head: Normocephalic and atraumatic.   Pulmonary:      Effort: Pulmonary effort is normal.      Breath sounds: Normal breath sounds.   Cardiovascular:      Normal rate. Regular rhythm.      Murmurs: There is no murmur.      No gallop. No click.   Skin:     General: Skin is warm and dry.   Neurological:      Mental Status: Alert and oriented to person, place, and time.       Procedures      ICD-10-CM ICD-9-CM   1. Coronary artery disease " involving coronary bypass graft of native heart without angina pectoris  I25.810 414.05   2. Essential hypertension  I10 401.9   3. Mixed hyperlipidemia  E78.2 272.2   4. Other chest pain  R07.89 786.59   5. Nocturnal hypoxia  G47.34 327.24         Assessment/Plan:  1.  Coronary artery disease: History of CABG in 2019.  LHC from 1/26/2022 showed single-vessel disease of LCx with patent bypass grafts, including SVG to OM.  Current chest pain is concerning for GI etiology and referral to GI has been placed.  Continue aspirin, Plavix, carvedilol, and rosuvastatin.     2.  Essential hypertension:  Blood pressure is mildly elevated.  Titrate up losartan to 100 mg.     4.  Mixed hyperlipidemia: Borderline control lipid panel from 9/7/2021.  Continue rosuvastatin.  Plan to order repeat lipid panel at follow-up if not done previously.     5.  Nocturnal hypoxia:  No evidence of sleep apnea but she did have reported hypoxia.  Follow-up in sleep clinic with Dr. Ceja as scheduled.

## 2022-05-18 ENCOUNTER — OFFICE VISIT (OUTPATIENT)
Dept: GASTROENTEROLOGY | Facility: CLINIC | Age: 77
End: 2022-05-18

## 2022-05-18 VITALS
SYSTOLIC BLOOD PRESSURE: 166 MMHG | BODY MASS INDEX: 21.66 KG/M2 | DIASTOLIC BLOOD PRESSURE: 82 MMHG | HEART RATE: 70 BPM | TEMPERATURE: 96 F | WEIGHT: 138 LBS | HEIGHT: 67 IN | OXYGEN SATURATION: 98 %

## 2022-05-18 DIAGNOSIS — D68.9 COAGULOPATHY: ICD-10-CM

## 2022-05-18 DIAGNOSIS — R10.13 EPIGASTRIC PAIN: Primary | ICD-10-CM

## 2022-05-18 PROCEDURE — 99214 OFFICE O/P EST MOD 30 MIN: CPT | Performed by: NURSE PRACTITIONER

## 2022-05-18 NOTE — PROGRESS NOTES
"[facility] GASTROENTEROLOGY - OFFICE NOTE    5/18/2022    Felipa Guerrero   1945    Primary Physician: Coleman Zapata MD     Referring Physician: Dr. Rodriguez Saleem    Chief Complaint   Patient presents with   • Abdominal Pain     Dry heaves at night         HISTORY OF PRESENT ILLNESS:     Felipa Guerrero is a 76 y.o. female presents with chest pain. Referred by cardiology. She points to the rashad area. This started years ago but worsened 1 mo ago. The pain is daily but intermittent. Described as a \" bad pain\". Eating makes pain worse. Has associated \" dry heaves\". Appetite is good but cannot finish meal due to the pain. No black stool. No weight loss. No fever. No history ulcers. No etoh.       Has taken pantoprazole daily for years. Takes aspirin prn. Takes ibuprofen daily for back pain.       She is on plavix.   Cardiac cath 1/2022.       EGD many yrs ago.   Had colonoscopy more than 10 year ago and was ok.  No personal history of colon polyps or colon cancer.       Labs 4/2022 in epic reviewed.   CT Abdomen Pelvis With & Without Contrast (11/15/2021 10:09)      Past Medical History:   Diagnosis Date   • Arthritis    • Asthma    • CAD (coronary artery disease)    • Cancer (HCC)     Female    • Cataract extraction status of eye, right 01/26/2022    cataract extration with lens inplant   • COPD (chronic obstructive pulmonary disease) (HCC)    • Coronary artery disease    • Depression    • Dyspnea on exertion    • Hearing loss    • Heart attack (HCC)    • Heart disease    • High blood pressure    • Hyperlipidemia    • Hypertension    • Hypertension    • Kidney stone    • Stroke (HCC)        Past Surgical History:   Procedure Laterality Date   • BACK SURGERY      40 Years Ago x2    • BLADDER SURGERY     • CARDIAC CATHETERIZATION     • CARDIAC CATHETERIZATION N/A 02/01/2022    Procedure: Left Heart Cath;  Surgeon: Angelo Saleem MD;  Location: [facility] CATH INVASIVE LOCATION;  Service: Cardiology;  Laterality: " N/A;   • CARDIAC SURGERY      3 years ago in [facility]    • CHOLECYSTECTOMY     • CORONARY ARTERY BYPASS GRAFT  03/2020     [facility]MEDO X1    • EYE SURGERY Bilateral 01/26/2022   • HYSTERECTOMY     • INCONTINENCE SURGERY      x2  Dr Lindsay   • NECK SURGERY      30 years ago [facility]        Outpatient Medications Marked as Taking for the 5/18/22 encounter (Office Visit) with Paulina Dugan APRN   Medication Sig Dispense Refill   • ASPIRIN 81 PO Take  by mouth 3 (Three) Times a Week.     • BREO ELLIPTA 100-25 MCG/INH aerosol powder  INHALE 1 PUFF PO QD  2   • carvedilol (COREG) 3.125 MG tablet Take 3.125 mg by mouth 2 (Two) Times a Day With Meals.     • citalopram (CeleXA) 20 MG tablet      • clopidogrel (PLAVIX) 75 MG tablet Take 75 mg by mouth Daily.     • diazePAM (VALIUM) 5 MG tablet TK 1 T PO BID  2   • hydroCHLOROthiazide (MICROZIDE) 12.5 MG capsule Take 12.5 mg by mouth Daily.     • HYDROcodone-acetaminophen (NORCO)  MG per tablet TK 1 T PO TID  0   • isosorbide mononitrate (IMDUR) 30 MG 24 hr tablet Take 1 tablet by mouth Daily. 30 tablet 0   • ketorolac (ACULAR) 0.5 % ophthalmic solution INSTILL 1 DROP INTO THE SURGICAL EYE THREE TIMES DAILY. START 2 DAYS BEFORE SURGERY THEN CONTINUE THREE TIMES DAILY UNTIL GONE     • losartan (COZAAR) 100 MG tablet Take 1 tablet by mouth Daily. 30 tablet 11   • meclizine (ANTIVERT) 25 MG tablet Take 25 mg by mouth 3 (Three) Times a Day As Needed for Dizziness.     • Mirabegron ER (Myrbetriq) 25 MG tablet sustained-release 24 hour 24 hr tablet Take 1 tablet by mouth Daily. 30 tablet 11   • ofloxacin (OCUFLOX) 0.3 % ophthalmic solution INSTILL 1 DROP TO SURGICAL EYE THREE TIMES DAILY. START 2 DAYS BEFORE SURGERY-USE UNTIL GONE     • pantoprazole (PROTONIX) 40 MG EC tablet Take 40 mg by mouth Daily.     • prednisoLONE acetate (PRED FORTE) 1 % ophthalmic suspension INSTILL 1 DROP INTO THE SURGICAL EYE THREE TIMES DAILY. START 2 DAYS PRIOR TO SURGERY  "THEN CONTINUE THREE TIMES DAILY UNTIL GONE     • rosuvastatin (CRESTOR) 20 MG tablet Take  by mouth Daily.  2       No Known Allergies    Social History     Socioeconomic History   • Marital status:    Tobacco Use   • Smoking status: Never Smoker   • Smokeless tobacco: Never Used   Vaping Use   • Vaping Use: Never used   Substance and Sexual Activity   • Alcohol use: No   • Drug use: No   • Sexual activity: Defer       Family History   Problem Relation Age of Onset   • Heart disease Mother    • Cancer Mother    • Stroke Mother    • COPD Father    • Lung disease Father    • Heart disease Father    • Heart disease Sister    • Breast cancer Sister    • Diabetes Brother    • Cancer Daughter    • Hepatitis Daughter    • COPD Son    • Lung cancer Son    • Colon cancer Neg Hx    • Colon polyps Neg Hx        Review of Systems   Constitutional: Negative for chills, fever and unexpected weight change.   Respiratory: Positive for shortness of breath (chronic ). Negative for wheezing.    Cardiovascular: Negative for chest pain and palpitations.   Gastrointestinal: Positive for abdominal pain. Negative for abdominal distention, anal bleeding, blood in stool, constipation, diarrhea, nausea and vomiting.        Vitals:    05/18/22 0950   BP: 166/82   Pulse: 70   Temp: 96 °F (35.6 °C)   SpO2: 98%   Weight: 62.6 kg (138 lb)   Height: 170.2 cm (67\")      Body mass index is 21.61 kg/m².    Physical Exam  Vitals reviewed.   Constitutional:       General: She is not in acute distress.  Cardiovascular:      Rate and Rhythm: Normal rate and regular rhythm.      Heart sounds: Normal heart sounds.   Pulmonary:      Effort: Pulmonary effort is normal.      Breath sounds: Normal breath sounds.   Abdominal:      General: Bowel sounds are normal. There is no distension.      Palpations: Abdomen is soft.      Tenderness: There is abdominal tenderness (mild tenderness rashad area. ).   Skin:     General: Skin is warm and dry.   Neurological: "      Mental Status: She is alert.         Results for orders placed or performed during the hospital encounter of 04/19/22   Comprehensive Metabolic Panel    Specimen: Blood   Result Value Ref Range    Glucose 96 65 - 99 mg/dL    BUN 22 8 - 23 mg/dL    Creatinine 0.67 0.57 - 1.00 mg/dL    Sodium 141 136 - 145 mmol/L    Potassium 3.5 3.5 - 5.2 mmol/L    Chloride 104 98 - 107 mmol/L    CO2 27.0 22.0 - 29.0 mmol/L    Calcium 9.9 8.6 - 10.5 mg/dL    Total Protein 7.1 6.0 - 8.5 g/dL    Albumin 4.40 3.50 - 5.20 g/dL    ALT (SGPT) 12 1 - 33 U/L    AST (SGOT) 21 1 - 32 U/L    Alkaline Phosphatase 109 39 - 117 U/L    Total Bilirubin 0.5 0.0 - 1.2 mg/dL    Globulin 2.7 gm/dL    A/G Ratio 1.6 g/dL    BUN/Creatinine Ratio 32.8 (H) 7.0 - 25.0    Anion Gap 10.0 5.0 - 15.0 mmol/L    eGFR 90.2 >60.0 mL/min/1.73   Troponin    Specimen: Blood   Result Value Ref Range    Troponin T <0.010 0.000 - 0.030 ng/mL   Troponin    Specimen: Arm, Left; Blood   Result Value Ref Range    Troponin T <0.010 0.000 - 0.030 ng/mL   CBC Auto Differential    Specimen: Blood   Result Value Ref Range    WBC 5.69 3.40 - 10.80 10*3/mm3    RBC 4.01 3.77 - 5.28 10*6/mm3    Hemoglobin 12.2 12.0 - 15.9 g/dL    Hematocrit 37.5 34.0 - 46.6 %    MCV 93.5 79.0 - 97.0 fL    MCH 30.4 26.6 - 33.0 pg    MCHC 32.5 31.5 - 35.7 g/dL    RDW 13.1 12.3 - 15.4 %    RDW-SD 44.9 37.0 - 54.0 fl    MPV 11.9 6.0 - 12.0 fL    Platelets 219 140 - 450 10*3/mm3    Neutrophil % 53.9 42.7 - 76.0 %    Lymphocyte % 32.9 19.6 - 45.3 %    Monocyte % 9.5 5.0 - 12.0 %    Eosinophil % 2.6 0.3 - 6.2 %    Basophil % 0.7 0.0 - 1.5 %    Immature Grans % 0.4 0.0 - 0.5 %    Neutrophils, Absolute 3.07 1.70 - 7.00 10*3/mm3    Lymphocytes, Absolute 1.87 0.70 - 3.10 10*3/mm3    Monocytes, Absolute 0.54 0.10 - 0.90 10*3/mm3    Eosinophils, Absolute 0.15 0.00 - 0.40 10*3/mm3    Basophils, Absolute 0.04 0.00 - 0.20 10*3/mm3    Immature Grans, Absolute 0.02 0.00 - 0.05 10*3/mm3    nRBC 0.0 0.0 - 0.2 /100  WBC   D-dimer, Quantitative    Specimen: Arm, Left; Blood   Result Value Ref Range    D-Dimer, Quantitative 0.59 (H) 0.00 - 0.50 mg/L (FEU)   Lipase    Specimen: Blood   Result Value Ref Range    Lipase 49 13 - 60 U/L   ECG 12 Lead   Result Value Ref Range    QT Interval 420 ms    QTC Interval 408 ms   ECG 12 Lead   Result Value Ref Range    QT Interval 428 ms    QTC Interval 405 ms   Green Top (Gel)   Result Value Ref Range    Extra Tube Hold for add-ons.    Lavender Top   Result Value Ref Range    Extra Tube hold for add-on    Red Top   Result Value Ref Range    Extra Tube Hold for add-ons.    Light Blue Top   Result Value Ref Range    Extra Tube hold for add-on            ASSESSMENT AND PLAN    Assessment & Plan     Diagnoses and all orders for this visit:    1. Epigastric pain (Primary)    2. Coagulopathy (HCC)  Comments:  plavix               Differential diagnoses discussed.   I recommend stop ibuprofen or take any other nsaids. Recommend continue protonix daily.     Plan for EGD.  However prior to scheduling I will send a letter to Dr. Saleem in regards to if the patient can safely hold plavix  7 days prior to procedure. I will contact patient once he has responded.  I have instructed the patient to contact our office if she has not heard from us within 2 weeks.      The patient was advised on the risks of stopping blood thinners for a procedure.  The risks discussed included the risk of developing myocardial infarction, CVA, embolus, clogging of the arteries or stents, etc.  We discussed the potential consequences of the risks discussed.  The benefits of stopping as well as the alternatives were discussed as well.   Patient is to hold their anticoagulation medication per the direction of their prescribing physician, Dr. Saleem. A letter will be sent to prescribing This is to prevent any risk or complication from bleeding intra and post procedure. If they develop bleeding post procedure they are to go the emergency  department for further evaluation and treatment immediately.       EGD  Risk, benefits, and alternatives of endoscopy were explained in full.  They understand that there is a risk of bleeding, perforation, and infection.  The risk of perforation goes up with esophageal dilation.  Other options to evaluate UGI complaints could involve barium swallow or UGI series, but these would be diagnostic tests only.  Patient was given time to ask questions.  I answered them to their satisfaction and they are agreeable to proceeding              Paulina Dugan, MAGALIS

## 2022-05-19 NOTE — PROGRESS NOTES
It is acceptable to hold Plavix for 5 to 7 days prior to the procedure and resume when safe afterwards.  Bridging with Lovenox is not indicated.  Patient must remain on aspirin throughout.

## 2022-05-20 ENCOUNTER — TELEPHONE (OUTPATIENT)
Dept: GASTROENTEROLOGY | Facility: CLINIC | Age: 77
End: 2022-05-20

## 2022-05-20 ENCOUNTER — PREP FOR SURGERY (OUTPATIENT)
Dept: OTHER | Facility: HOSPITAL | Age: 77
End: 2022-05-20

## 2022-05-20 DIAGNOSIS — R10.13 EPIGASTRIC PAIN: Primary | ICD-10-CM

## 2022-05-20 NOTE — TELEPHONE ENCOUNTER
Please let patient know that Dr. Saleem approves him to be off Plavix 7 days prior to procedure.  However he must remain on aspirin.  I will put in case request for upper endoscopy.              ----- Message from Angelo Saleem MD sent at 5/19/2022 12:30 PM CDT -----      ----- Message -----  From: Paulina Dugan APRN  Sent: 5/18/2022  10:25 AM CDT  To: Angelo Saleem MD

## 2022-05-20 NOTE — TELEPHONE ENCOUNTER
PT is scheduled for June 8, 2022.  Verbalized understanding stopping her Plavix 7 days prior to procedure.  She will remain on ASA.  Shared with PT that she will need the COVID test 72 hours prior to.  Scheduling will contact her to schedule.    Will mail instructions.

## 2022-05-23 DIAGNOSIS — Z01.818 PREOPERATIVE TESTING: Primary | ICD-10-CM

## 2022-05-25 PROBLEM — R10.13 EPIGASTRIC PAIN: Status: ACTIVE | Noted: 2022-05-25

## 2022-06-06 ENCOUNTER — LAB (OUTPATIENT)
Dept: LAB | Facility: HOSPITAL | Age: 77
End: 2022-06-06

## 2022-06-06 LAB — SARS-COV-2 ORF1AB RESP QL NAA+PROBE: NOT DETECTED

## 2022-06-06 PROCEDURE — C9803 HOPD COVID-19 SPEC COLLECT: HCPCS

## 2022-06-06 PROCEDURE — U0004 COV-19 TEST NON-CDC HGH THRU: HCPCS | Performed by: NURSE PRACTITIONER

## 2022-06-06 PROCEDURE — U0005 INFEC AGEN DETEC AMPLI PROBE: HCPCS | Performed by: NURSE PRACTITIONER

## 2022-06-08 ENCOUNTER — ANESTHESIA (OUTPATIENT)
Dept: GASTROENTEROLOGY | Facility: HOSPITAL | Age: 77
End: 2022-06-08

## 2022-06-08 ENCOUNTER — ANESTHESIA EVENT (OUTPATIENT)
Dept: GASTROENTEROLOGY | Facility: HOSPITAL | Age: 77
End: 2022-06-08

## 2022-06-08 ENCOUNTER — HOSPITAL ENCOUNTER (OUTPATIENT)
Facility: HOSPITAL | Age: 77
Setting detail: HOSPITAL OUTPATIENT SURGERY
Discharge: HOME | End: 2022-06-08
Attending: INTERNAL MEDICINE | Admitting: INTERNAL MEDICINE

## 2022-06-08 VITALS
HEIGHT: 67 IN | SYSTOLIC BLOOD PRESSURE: 141 MMHG | WEIGHT: 136 LBS | DIASTOLIC BLOOD PRESSURE: 74 MMHG | HEART RATE: 68 BPM | BODY MASS INDEX: 21.35 KG/M2 | RESPIRATION RATE: 20 BRPM | TEMPERATURE: 97.1 F | OXYGEN SATURATION: 95 %

## 2022-06-08 DIAGNOSIS — R10.13 EPIGASTRIC PAIN: ICD-10-CM

## 2022-06-08 PROCEDURE — 43239 EGD BIOPSY SINGLE/MULTIPLE: CPT | Performed by: INTERNAL MEDICINE

## 2022-06-08 PROCEDURE — 25010000002 PROPOFOL 10 MG/ML EMULSION: Performed by: NURSE ANESTHETIST, CERTIFIED REGISTERED

## 2022-06-08 PROCEDURE — 87081 CULTURE SCREEN ONLY: CPT | Performed by: INTERNAL MEDICINE

## 2022-06-08 RX ORDER — LIDOCAINE HYDROCHLORIDE 10 MG/ML
0.5 INJECTION, SOLUTION EPIDURAL; INFILTRATION; INTRACAUDAL; PERINEURAL ONCE AS NEEDED
Status: DISCONTINUED | OUTPATIENT
Start: 2022-06-08 | End: 2022-06-08 | Stop reason: HOSPADM

## 2022-06-08 RX ORDER — SODIUM CHLORIDE 9 MG/ML
500 INJECTION, SOLUTION INTRAVENOUS CONTINUOUS PRN
Status: CANCELLED | OUTPATIENT
Start: 2022-06-08

## 2022-06-08 RX ORDER — LIDOCAINE HYDROCHLORIDE 10 MG/ML
0.5 INJECTION, SOLUTION EPIDURAL; INFILTRATION; INTRACAUDAL; PERINEURAL ONCE AS NEEDED
Status: CANCELLED | OUTPATIENT
Start: 2022-06-08

## 2022-06-08 RX ORDER — LIDOCAINE HYDROCHLORIDE 20 MG/ML
INJECTION, SOLUTION EPIDURAL; INFILTRATION; INTRACAUDAL; PERINEURAL AS NEEDED
Status: DISCONTINUED | OUTPATIENT
Start: 2022-06-08 | End: 2022-06-08 | Stop reason: SURG

## 2022-06-08 RX ORDER — SODIUM CHLORIDE 0.9 % (FLUSH) 0.9 %
10 SYRINGE (ML) INJECTION AS NEEDED
Status: CANCELLED | OUTPATIENT
Start: 2022-06-08

## 2022-06-08 RX ORDER — PROPOFOL 10 MG/ML
VIAL (ML) INTRAVENOUS AS NEEDED
Status: DISCONTINUED | OUTPATIENT
Start: 2022-06-08 | End: 2022-06-08 | Stop reason: SURG

## 2022-06-08 RX ORDER — ONDANSETRON 2 MG/ML
4 INJECTION INTRAMUSCULAR; INTRAVENOUS ONCE AS NEEDED
Status: DISCONTINUED | OUTPATIENT
Start: 2022-06-08 | End: 2022-06-08 | Stop reason: HOSPADM

## 2022-06-08 RX ORDER — SODIUM CHLORIDE 9 MG/ML
500 INJECTION, SOLUTION INTRAVENOUS CONTINUOUS PRN
Status: DISCONTINUED | OUTPATIENT
Start: 2022-06-08 | End: 2022-06-08 | Stop reason: HOSPADM

## 2022-06-08 RX ADMIN — PROPOFOL 60 MG: 10 INJECTION, EMULSION INTRAVENOUS at 09:26

## 2022-06-08 RX ADMIN — PROPOFOL 30 MG: 10 INJECTION, EMULSION INTRAVENOUS at 09:28

## 2022-06-08 RX ADMIN — LIDOCAINE HYDROCHLORIDE 80 MG: 20 INJECTION, SOLUTION EPIDURAL; INFILTRATION; INTRACAUDAL; PERINEURAL at 09:26

## 2022-06-08 RX ADMIN — SODIUM CHLORIDE 500 ML: 9 INJECTION, SOLUTION INTRAVENOUS at 07:55

## 2022-06-08 RX ADMIN — PROPOFOL 30 MG: 10 INJECTION, EMULSION INTRAVENOUS at 09:31

## 2022-06-08 NOTE — ANESTHESIA PREPROCEDURE EVALUATION
Anesthesia Evaluation     Patient summary reviewed   history of anesthetic complications: PONV  NPO Solid Status: > 8 hours             Airway   Mallampati: II  TM distance: >3 FB  Neck ROM: full  No difficulty expected  Dental    (+) poor dentition    Pulmonary    (+) COPD, asthma,  Cardiovascular   Exercise tolerance: good (4-7 METS)    (+) hypertension, past MI , CAD, CABG, hyperlipidemia,       Neuro/Psych  (+) CVA,    (-) seizures, TIA  GI/Hepatic/Renal/Endo    (+)   renal disease stones,   (-) liver disease, diabetes    Musculoskeletal     Abdominal    Substance History      OB/GYN          Other                        Anesthesia Plan    ASA 3     MAC       Anesthetic plan, risks, benefits, and alternatives have been provided, discussed and informed consent has been obtained with: patient.        CODE STATUS:

## 2022-06-08 NOTE — ANESTHESIA POSTPROCEDURE EVALUATION
Patient: Felipa Guerrero    Procedure Summary     Date: 06/08/22 Room / Location: [facility] ENDOSCOPY 4 / [facility] ENDOSCOPY    Anesthesia Start: 0909 Anesthesia Stop: 0935    Procedure: ESOPHAGOGASTRODUODENOSCOPY WITH ANESTHESIA (N/A ) Diagnosis:       Epigastric pain      (Epigastric pain [R10.13])    Surgeons: Ean Manley MD Provider: Ishmael Sutherland CRNA    Anesthesia Type: MAC ASA Status: 3          Anesthesia Type: MAC    Vitals  Vitals Value Taken Time   /61 06/08/22 0936   Temp     Pulse 69 06/08/22 0939   Resp 16 06/08/22 0936   SpO2 92 % 06/08/22 0939   Vitals shown include unvalidated device data.        Post Anesthesia Care and Evaluation    Patient location during evaluation: PHASE II  Patient participation: complete - patient participated  Level of consciousness: awake  Pain score: 0  Pain management: adequate    Airway patency: patent  Anesthetic complications: No anesthetic complications  PONV Status: none  Cardiovascular status: acceptable  Respiratory status: acceptable  Hydration status: acceptable

## 2022-06-08 NOTE — INTERVAL H&P NOTE
H&P reviewed. The patient was examined and there are no changes to the H&P.      She states she has been off NSAIDs for 2 weeks.  She still has episodic nausea with vomiting.  Can occur at any time.  She does not think it is related to constipation.  Denies any neuro symptoms.  Okay to proceed with endoscopy

## 2022-06-09 LAB — UREASE TISS QL: NEGATIVE

## 2022-06-20 ENCOUNTER — OFFICE VISIT (OUTPATIENT)
Dept: NEUROLOGY | Facility: CLINIC | Age: 77
End: 2022-06-20

## 2022-06-20 VITALS
RESPIRATION RATE: 17 BRPM | HEIGHT: 67 IN | OXYGEN SATURATION: 99 % | BODY MASS INDEX: 21.35 KG/M2 | DIASTOLIC BLOOD PRESSURE: 70 MMHG | WEIGHT: 136 LBS | SYSTOLIC BLOOD PRESSURE: 132 MMHG | HEART RATE: 80 BPM

## 2022-06-20 DIAGNOSIS — R09.02 HYPOXIA: Primary | ICD-10-CM

## 2022-06-20 PROCEDURE — 99213 OFFICE O/P EST LOW 20 MIN: CPT | Performed by: PSYCHIATRY & NEUROLOGY

## 2022-06-20 NOTE — PROGRESS NOTES
HISTORY: Patient had the overnight continuous oximetry on room air which showed significant desaturations and patient was started on 1 L/min oxygen and continued to have desaturations and increased to 1.5 L/min.  Patient overnight continuous oximetry on a 1.5 L/min looks good.  Patient is wearing her nasal oxygen nightly.  Patient does not smoke.  Patient does drive and I did review safety and driving precautions in detail.  Patient had a polysomnogram in lab which did not show sleep apnea.  It was poor sleep efficiency of 30% and did not show significant desaturations.  Patient is with her granddaughter today.  States that she is feeling better on the oxygen and more alert.  [place] Sleepiness Scale is 5.  Patient has not had any trouble driving patient's BMI is 21.3.  Neck circumference is 13.5 inches.    EXAM: Blood pressure today 140/78 left arm sitting 132/70 left arm standing with pulse 66.  No acute fundic abnormalities.  Tympanic membranes obscured by wax bilaterally.  Patient wears hearing aids.  No acute cardiopulmonary abnormalities by auscultation.  No bruits/no lymphadenopathy.  Abdomen nondistended with no liver or spleen edge felt.  No acute oropharynx abnormalities.  Mallampati 2.    IMPRESSION: Patient with hypoxia but no evidence of sleep apnea.  Patient to continue oxygen as above.  Patient to continue safety and driving precautions as above.  I spent 20 minutes with this patient with counseling exam and review of records.  BMI in normal range for age.

## 2022-07-18 ENCOUNTER — OFFICE VISIT (OUTPATIENT)
Dept: GASTROENTEROLOGY | Facility: CLINIC | Age: 77
End: 2022-07-18

## 2022-07-18 VITALS
OXYGEN SATURATION: 99 % | HEART RATE: 76 BPM | BODY MASS INDEX: 21.19 KG/M2 | TEMPERATURE: 95.9 F | DIASTOLIC BLOOD PRESSURE: 76 MMHG | SYSTOLIC BLOOD PRESSURE: 130 MMHG | HEIGHT: 67 IN | WEIGHT: 135 LBS

## 2022-07-18 DIAGNOSIS — R10.13 EPIGASTRIC PAIN: Primary | ICD-10-CM

## 2022-07-18 DIAGNOSIS — R11.0 NAUSEA: ICD-10-CM

## 2022-07-18 PROCEDURE — 99214 OFFICE O/P EST MOD 30 MIN: CPT | Performed by: NURSE PRACTITIONER

## 2022-07-18 RX ORDER — DICYCLOMINE HYDROCHLORIDE 10 MG/1
10 CAPSULE ORAL
Qty: 60 CAPSULE | Refills: 1 | Status: SHIPPED | OUTPATIENT
Start: 2022-07-18 | End: 2022-08-29 | Stop reason: SINTOL

## 2022-07-18 NOTE — PROGRESS NOTES
"[facility] GASTROENTEROLOGY - OFFICE NOTE    7/18/2022    Felipa Guerrero   1945    Primary Physician: Coleman Zapata MD    Chief Complaint   Patient presents with   • Follow-up     Post endoscopy     Dena pain and nausea     HISTORY OF PRESENT ILLNESS:     Felipa Guerrero is a 77 y.o. female presents for follow up dena pain. The pain has resolved but still with nausea after eating. This occurs immediately after eating. Has lost about 5 pounds.  Appetite is good. She is not diabetic.  No certain food. No vomiting. No constipation. No history glaucoma.       She has been under a lot of stress with her grandkids. She did start myrbetriq prior to the nausea starting.  I did look up Myrbetriq and can cause nausea with \"frequency not defined \".    She did have an MRI of her brain at [place] May 2021, reviewed.   She also had CTA angio of the neck September 2021, reviewed.  She has CT scan abdomen and pelvis November 2021, reviewed.    UPPER GI ENDOSCOPY (06/08/2022 09:08)  - Small hiatal hernia.  - Minimal Gastritis.  - Normal examined duodenum.  - Biopsies were taken with a cold forceps for Helicobacter pylori testing.  - Questionable area of prior antral ulceration that is now healed over  Recommendations   - Observe patient's clinical course.  - Continue Protonix every morning. Small meals throughout the day. Continue to avoid NSAIDs  as doing. Observe clinical course on this regimen.  - Return to GI office in 4 weeks.  - await h pylori results,     Urea negative.           No family history of colon cancer/polyps.   ========================================================================  Ov  5-18-22 HPI Felipa Guerrero is a 76 y.o. female presents with chest pain. Referred by cardiology. She points to the dena area. This started years ago but worsened 1 mo ago. The pain is daily but intermittent. Described as a \" bad pain\". Eating makes pain worse. Has associated \" dry heaves\". Appetite is good but cannot " finish meal due to the pain. No black stool. No weight loss. No fever. No history ulcers. No etoh.         Has taken pantoprazole daily for years. Takes aspirin prn. Takes ibuprofen daily for back pain.         She is on plavix.   Cardiac cath 1/2022.         EGD many yrs ago.   Had colonoscopy more than 10 year ago and was ok.  No personal history of colon polyps or colon cancer.         Labs 4/2022 in epic reviewed.   CT Abdomen Pelvis With & Without Contrast (11/15/2021 10:09)    Past Medical History:   Diagnosis Date   • Arthritis    • Asthma    • CAD (coronary artery disease)    • Cancer (HCC)     Female    • Cataract extraction status of eye, right 01/26/2022    cataract extration with lens inplant   • COPD (chronic obstructive pulmonary disease) (HCC)    • Coronary artery disease    • Depression    • Dyspnea on exertion    • Hearing loss    • Heart attack (HCC)    • Heart disease    • High blood pressure    • Hyperlipidemia    • Hypertension    • Hypertension    • Kidney stone    • Stroke (HCC)        Past Surgical History:   Procedure Laterality Date   • BACK SURGERY      40 Years Ago x2    • BLADDER SURGERY     • CARDIAC CATHETERIZATION     • CARDIAC CATHETERIZATION N/A 02/01/2022    Procedure: Left Heart Cath;  Surgeon: Angelo Saleem MD;  Location: [facility] CATH INVASIVE LOCATION;  Service: Cardiology;  Laterality: N/A;   • CARDIAC SURGERY      3 years ago in [facility]    • CHOLECYSTECTOMY     • CORONARY ARTERY BYPASS GRAFT  03/2020    DR LANIE OLMEDO X1    • ENDOSCOPY N/A 6/8/2022    Procedure: ESOPHAGOGASTRODUODENOSCOPY WITH ANESTHESIA;  Surgeon: Ean Manley MD;  Location: [facility] ENDOSCOPY;  Service: Gastroenterology;  Laterality: N/A;  pre epigastric pain  post gastritis, hiatal hernia  dr gómez feliciano   • EYE SURGERY Bilateral 01/26/2022   • HYSTERECTOMY     • INCONTINENCE SURGERY      x2  Dr Lindsay   • NECK SURGERY      30 years ago [facility]        Outpatient  Medications Marked as Taking for the 7/18/22 encounter (Office Visit) with Paulina Dugan APRN   Medication Sig Dispense Refill   • ASPIRIN 81 PO Take  by mouth 3 (Three) Times a Week.     • BREO ELLIPTA 100-25 MCG/INH aerosol powder  INHALE 1 PUFF PO QD  2   • carvedilol (COREG) 3.125 MG tablet Take 3.125 mg by mouth 2 (Two) Times a Day With Meals.     • citalopram (CeleXA) 20 MG tablet      • clopidogrel (PLAVIX) 75 MG tablet Take 75 mg by mouth Daily.     • diazePAM (VALIUM) 5 MG tablet TK 1 T PO BID  2   • hydroCHLOROthiazide (MICROZIDE) 12.5 MG capsule Take 12.5 mg by mouth Daily.     • HYDROcodone-acetaminophen (NORCO)  MG per tablet TK 1 T PO TID  0   • isosorbide mononitrate (IMDUR) 30 MG 24 hr tablet Take 1 tablet by mouth Daily. 30 tablet 0   • losartan (COZAAR) 100 MG tablet Take 1 tablet by mouth Daily. 30 tablet 11   • meclizine (ANTIVERT) 25 MG tablet Take 25 mg by mouth 3 (Three) Times a Day As Needed for Dizziness.     • Mirabegron ER (Myrbetriq) 25 MG tablet sustained-release 24 hour 24 hr tablet Take 1 tablet by mouth Daily. 30 tablet 11   • pantoprazole (PROTONIX) 40 MG EC tablet Take 40 mg by mouth Daily.     • rosuvastatin (CRESTOR) 20 MG tablet Take  by mouth Daily.  2   • tiZANidine (ZANAFLEX) 4 MG tablet TK 1 T PO BID  2       No Known Allergies    Social History     Socioeconomic History   • Marital status:    Tobacco Use   • Smoking status: Never Smoker   • Smokeless tobacco: Never Used   Vaping Use   • Vaping Use: Never used   Substance and Sexual Activity   • Alcohol use: No   • Drug use: No   • Sexual activity: Defer       Family History   Problem Relation Age of Onset   • Heart disease Mother    • Cancer Mother    • Stroke Mother    • COPD Father    • Lung disease Father    • Heart disease Father    • Heart disease Sister    • Breast cancer Sister    • Diabetes Brother    • Cancer Daughter    • Hepatitis Daughter    • COPD Son    • Lung cancer Son    • Colon cancer Neg Hx  "   • Colon polyps Neg Hx        Review of Systems   Constitutional: Negative for chills, fever and unexpected weight change.   Respiratory: Negative for shortness of breath and wheezing.    Cardiovascular: Negative for chest pain and palpitations.   Gastrointestinal: Positive for nausea. Negative for abdominal distention, abdominal pain, anal bleeding, blood in stool, constipation, diarrhea and vomiting.        Vitals:    07/18/22 1044   BP: 130/76   Pulse: 76   Temp: 95.9 °F (35.5 °C)   SpO2: 99%   Weight: 61.2 kg (135 lb)   Height: 170.2 cm (67\")      Body mass index is 21.14 kg/m².    Physical Exam  Vitals reviewed.   Constitutional:       General: She is not in acute distress.  Cardiovascular:      Rate and Rhythm: Normal rate and regular rhythm.      Heart sounds: Normal heart sounds.   Pulmonary:      Effort: Pulmonary effort is normal.      Breath sounds: Normal breath sounds.   Abdominal:      General: Bowel sounds are normal. There is no distension.      Palpations: Abdomen is soft.      Tenderness: There is no abdominal tenderness.   Skin:     General: Skin is warm and dry.   Neurological:      Mental Status: She is alert.         Results for orders placed or performed during the hospital encounter of 06/08/22   Urease For H Pylori - Tissue, Stomach    Specimen: Stomach; Tissue   Result Value Ref Range    Urease Negative Negative           ASSESSMENT AND PLAN    Assessment & Plan     Diagnoses and all orders for this visit:    1. Epigastric pain (Primary)  -     CT Abdomen Pelvis With Contrast; Future    2. Nausea    Other orders  -     dicyclomine (Bentyl) 10 MG capsule; Take 1 capsule by mouth 4 (Four) Times a Day Before Meals & at Bedtime.  Dispense: 60 capsule; Refill: 1      Epigastric pain has resolved.  Her main complaint now is of nausea that occurs after eating.  At this point I would recommend CT scan of the abdomen and pelvis for further evaluation and she is agreeable.  I will contact her with " results of the CT scan.  We also discussed possible gastric emptying study.  Further orders pending patient progress and test results.  I also discussed with her trial of Bentyl and discussed adverse effects.  Tentatively office visit in 1 month to reassess.           Return in about 1 month (around 8/18/2022).          Paulina Dugan, APRN

## 2022-07-25 ENCOUNTER — HOSPITAL ENCOUNTER (OUTPATIENT)
Dept: CT IMAGING | Facility: HOSPITAL | Age: 77
Discharge: HOME | End: 2022-07-25
Admitting: NURSE PRACTITIONER

## 2022-07-25 DIAGNOSIS — R10.13 EPIGASTRIC PAIN: ICD-10-CM

## 2022-07-25 LAB — CREAT BLDA-MCNC: 1 MG/DL (ref 0.6–1.3)

## 2022-07-25 PROCEDURE — 74177 CT ABD & PELVIS W/CONTRAST: CPT

## 2022-07-25 PROCEDURE — 25010000002 IOPAMIDOL 61 % SOLUTION: Performed by: NURSE PRACTITIONER

## 2022-07-25 PROCEDURE — 82565 ASSAY OF CREATININE: CPT

## 2022-07-25 RX ADMIN — IOPAMIDOL 100 ML: 612 INJECTION, SOLUTION INTRAVENOUS at 08:33

## 2022-07-26 ENCOUNTER — TELEPHONE (OUTPATIENT)
Dept: GASTROENTEROLOGY | Facility: CLINIC | Age: 77
End: 2022-07-26

## 2022-07-26 NOTE — TELEPHONE ENCOUNTER
Walter, can you please call Dr. Zapata office and let them know I will defer to Dr. Ribera any further work-up/evaluation of the right lower lung nodule noted on the CAT scan of the abdomen.  I will fax CT report.        I called her and went over her CT scan results.  I did not see anything that would be causing her abdominal pain or nausea.  There was a tiny nodule in the right lower lung.  She has been using the Bentyl and thinks that it does help.  She has a follow-up appointment here August 18.  She will keep that appointment.

## 2022-08-18 ENCOUNTER — OFFICE VISIT (OUTPATIENT)
Dept: GASTROENTEROLOGY | Facility: CLINIC | Age: 77
End: 2022-08-18

## 2022-08-18 VITALS
TEMPERATURE: 96.4 F | SYSTOLIC BLOOD PRESSURE: 156 MMHG | OXYGEN SATURATION: 100 % | BODY MASS INDEX: 21.19 KG/M2 | DIASTOLIC BLOOD PRESSURE: 78 MMHG | WEIGHT: 135 LBS | HEART RATE: 57 BPM | HEIGHT: 67 IN

## 2022-08-18 DIAGNOSIS — R10.13 EPIGASTRIC PAIN: Primary | ICD-10-CM

## 2022-08-18 DIAGNOSIS — R11.0 NAUSEA: ICD-10-CM

## 2022-08-18 PROCEDURE — 99213 OFFICE O/P EST LOW 20 MIN: CPT | Performed by: INTERNAL MEDICINE

## 2022-08-18 NOTE — PROGRESS NOTES
"[facility] Gastroenterology    Chief Complaint   Patient presents with   • Abdominal Pain       Subjective     HPI    Felipa Guerrero is a 77 y.o. female who presents with a chief complaint of epigastric pain    Patient was here in July for follow-up of her abdominal discomfort she was having epigastric comfort.  Upper endoscopy in June was relatively unrevealing for source.  She had a CT scan in November.  Paulina ordered a follow-up CT scan which noted no acute abdominal abnormalities.  There was small lung lesion which the patient was advised follow-up with her primary care in the primary care's office was notified.  Paulina did place her on Bentyl and she did contact the patient noting that the Bentyl was helping.    He comes in today with her daughter.  She tells me she is doing well.  Denies any abdominal pain states she has not had that in a few weeks.  She had to go off the Bentyl.  She is taking it once or twice daily.  She is not certain as she cannot recall exactly.  But she notes that she did go see her primary care doctor who because she was having some dizziness and they recommend she get off the Bentyl.  She will occasionally have a dizzy episode but doing much better.    Her appetite is good.  She does suffer some constipation.  She will take a stool softener and that helps.  Everything else is going well.    =================== copy of July 18, 2022 HPI=====================     HISTORY OF PRESENT ILLNESS:      Felipa Guerrero is a 77 y.o. female presents for follow up rashad pain. The pain has resolved but still with nausea after eating. This occurs immediately after eating. Has lost about 5 pounds.  Appetite is good. She is not diabetic.  No certain food. No vomiting. No constipation. No history glaucoma.         She has been under a lot of stress with her grandkids. She did start myrbetriq prior to the nausea starting.  I did look up Myrbetriq and can cause nausea with \"frequency not defined \".     She " "did have an MRI of her brain at [place] May 2021, reviewed.   She also had CTA angio of the neck September 2021, reviewed.  She has CT scan abdomen and pelvis November 2021, reviewed.     UPPER GI ENDOSCOPY (06/08/2022 09:08)  - Small hiatal hernia.  - Minimal Gastritis.  - Normal examined duodenum.  - Biopsies were taken with a cold forceps for Helicobacter pylori testing.  - Questionable area of prior antral ulceration that is now healed over  Recommendations   - Observe patient's clinical course.  - Continue Protonix every morning. Small meals throughout the day. Continue to avoid NSAIDs  as doing. Observe clinical course on this regimen.  - Return to GI office in 4 weeks.  - await h pylori results,     Urea negative.               No family history of colon cancer/polyps.   ========================================================================  Ov  5-18-22 HPI Felipa Guerrero is a 76 y.o. female presents with chest pain. Referred by cardiology. She points to the rashad area. This started years ago but worsened 1 mo ago. The pain is daily but intermittent. Described as a \" bad pain\". Eating makes pain worse. Has associated \" dry heaves\". Appetite is good but cannot finish meal due to the pain. No black stool. No weight loss. No fever. No history ulcers. No etoh.         Has taken pantoprazole daily for years. Takes aspirin prn. Takes ibuprofen daily for back pain.         She is on plavix.   Cardiac cath 1/2022.         EGD many yrs ago.   Had colonoscopy more than 10 year ago and was ok.  No personal history of colon polyps or colon cancer.         Labs 4/2022 in [facility] reviewed.   CT Abdomen Pelvis With & Without Contrast (11/15/2021 10:09)       Past Medical History:   Diagnosis Date   • Arthritis    • Asthma    • CAD (coronary artery disease)    • Cancer (HCC)     Female    • Cataract extraction status of eye, right 01/26/2022    cataract extration with lens inplant   • COPD (chronic obstructive pulmonary disease) " (HCC)    • Coronary artery disease    • Depression    • Dyspnea on exertion    • Hearing loss    • Heart attack (HCC)    • Heart disease    • High blood pressure    • Hyperlipidemia    • Hypertension    • Hypertension    • Kidney stone    • Lung anomaly    • Stroke (HCC)        Past Surgical History:   Procedure Laterality Date   • BACK SURGERY      40 Years Ago x2    • BLADDER SURGERY     • CARDIAC CATHETERIZATION     • CARDIAC CATHETERIZATION N/A 02/01/2022    Procedure: Left Heart Cath;  Surgeon: Angelo Saleem MD;  Location:  [facility] CATH INVASIVE LOCATION;  Service: Cardiology;  Laterality: N/A;   • CARDIAC SURGERY      3 years ago in [facility]    • CHOLECYSTECTOMY     • CORONARY ARTERY BYPASS GRAFT  03/2020    DR LANIE OLMEDO X1    • ENDOSCOPY N/A 6/8/2022    Procedure: ESOPHAGOGASTRODUODENOSCOPY WITH ANESTHESIA;  Surgeon: Ean Manley MD;  Location:  [facility] ENDOSCOPY;  Service: Gastroenterology;  Laterality: N/A;  pre epigastric pain  post gastritis, hiatal hernia  dr gómez feliciano   • EYE SURGERY Bilateral 01/26/2022   • HYSTERECTOMY     • INCONTINENCE SURGERY      x2  Dr Lindsay   • NECK SURGERY      30 years ago [place]          Current Outpatient Medications:   •  ASPIRIN 81 PO, Take  by mouth 3 (Three) Times a Week., Disp: , Rfl:   •  BREO ELLIPTA 100-25 MCG/INH aerosol powder , INHALE 1 PUFF PO QD, Disp: , Rfl: 2  •  carvedilol (COREG) 3.125 MG tablet, Take 3.125 mg by mouth 2 (Two) Times a Day With Meals., Disp: , Rfl:   •  citalopram (CeleXA) 20 MG tablet, , Disp: , Rfl:   •  clopidogrel (PLAVIX) 75 MG tablet, Take 75 mg by mouth Daily., Disp: , Rfl:   •  diazePAM (VALIUM) 5 MG tablet, TK 1 T PO BID, Disp: , Rfl: 2  •  hydroCHLOROthiazide (MICROZIDE) 12.5 MG capsule, Take 12.5 mg by mouth Daily., Disp: , Rfl:   •  HYDROcodone-acetaminophen (NORCO)  MG per tablet, TK 1 T PO TID, Disp: , Rfl: 0  •  isosorbide mononitrate (IMDUR) 30 MG 24 hr tablet, Take 1 tablet by  mouth Daily., Disp: 30 tablet, Rfl: 0  •  losartan (COZAAR) 100 MG tablet, Take 1 tablet by mouth Daily., Disp: 30 tablet, Rfl: 11  •  meclizine (ANTIVERT) 25 MG tablet, Take 25 mg by mouth 3 (Three) Times a Day As Needed for Dizziness., Disp: , Rfl:   •  Mirabegron ER (Myrbetriq) 25 MG tablet sustained-release 24 hour 24 hr tablet, Take 1 tablet by mouth Daily., Disp: 30 tablet, Rfl: 11  •  pantoprazole (PROTONIX) 40 MG EC tablet, Take 40 mg by mouth Daily., Disp: , Rfl:   •  rosuvastatin (CRESTOR) 20 MG tablet, Take  by mouth Daily., Disp: , Rfl: 2  •  tiZANidine (ZANAFLEX) 4 MG tablet, TK 1 T PO BID, Disp: , Rfl: 2  •  dicyclomine (Bentyl) 10 MG capsule, Take 1 capsule by mouth 4 (Four) Times a Day Before Meals & at Bedtime., Disp: 60 capsule, Rfl: 1  •  ketorolac (ACULAR) 0.5 % ophthalmic solution, INSTILL 1 DROP INTO THE SURGICAL EYE THREE TIMES DAILY. START 2 DAYS BEFORE SURGERY THEN CONTINUE THREE TIMES DAILY UNTIL GONE, Disp: , Rfl:   •  ofloxacin (OCUFLOX) 0.3 % ophthalmic solution, INSTILL 1 DROP TO SURGICAL EYE THREE TIMES DAILY. START 2 DAYS BEFORE SURGERY-USE UNTIL GONE, Disp: , Rfl:   •  prednisoLONE acetate (PRED FORTE) 1 % ophthalmic suspension, INSTILL 1 DROP INTO THE SURGICAL EYE THREE TIMES DAILY. START 2 DAYS PRIOR TO SURGERY THEN CONTINUE THREE TIMES DAILY UNTIL GONE, Disp: , Rfl:     No Known Allergies    Social History     Socioeconomic History   • Marital status:    Tobacco Use   • Smoking status: Never Smoker   • Smokeless tobacco: Never Used   Vaping Use   • Vaping Use: Never used   Substance and Sexual Activity   • Alcohol use: No   • Drug use: No   • Sexual activity: Defer       Family History   Problem Relation Age of Onset   • Heart disease Mother    • Cancer Mother    • Stroke Mother    • COPD Father    • Lung disease Father    • Heart disease Father    • Heart disease Sister    • Breast cancer Sister    • Diabetes Brother    • Cancer Daughter    • Hepatitis Daughter    • COPD  Son    • Lung cancer Son    • Colon cancer Neg Hx    • Colon polyps Neg Hx        Review of Systems  No blood in stools, no bloating, weight has been stable.  Eating well, no nausea    Objective     Vitals:    08/18/22 1417   BP: 156/78   Pulse: 57   Temp: 96.4 °F (35.8 °C)   SpO2: 100%       Physical Exam  Vitals reviewed.   Constitutional:       Appearance: Normal appearance. She is not ill-appearing or diaphoretic.   Pulmonary:      Effort: Pulmonary effort is normal.   Abdominal:      General: Abdomen is flat. There is no distension.      Palpations: Abdomen is soft. There is no mass.      Tenderness: There is no abdominal tenderness. There is no guarding or rebound.      Hernia: No hernia is present.   Neurological:      Mental Status: She is alert.   Psychiatric:         Thought Content: Thought content normal.         Judgment: Judgment normal.               Assessment & Plan   Problem List Items Addressed This Visit        Gastrointestinal Abdominal     Epigastric pain - Primary    Overview     Improved as of August 2022         Nausea            Clinically she is better.  Her nausea and abdominal discomfort have subsided.  She did take Bentyl but she got off of it because of dizziness.  Talk to the patient and her daughter that the Bentyl may have contributed to her dizziness I think is reasonable for her to hold off of it.  She may be able to take on rare occasion but not routinely.  If she is going take and make sure she is not driving or put herself at risk for fall.  For the most part she is going to stay off it and I think that this reasonable.  She is asymptomatic at this time.    She does have a little bit of constipation.  I suggest that she can try MiraLAX if needed.  I talked her how she can titrate the dose up or down.    I did review her CT report with the patient and daughter.  I informed him of the small lung nodule that was noted and advised to follow-up with her PCP and they  agreed.    Lastly, we did talk about the option of pursuing colonoscopy exam for screening and diagnostic purposes.  We talked about prep options.  Is been over 10 years since her last 1.  She expressed understanding but politely declined.  I asked her to contact us if she decides she wants to pursue.    She will come back and see us as needed    Continue ongoing management by primary care provider and other specialists.     Body mass index is 21.14 kg/m².  Normal BMI      EMR Dragon/transcription disclaimer:  Much of this encounter note is electronic transcription/translation of spoken language to printed text.  The electronic translation of spoken language may be erroneous, or at times, nonsensical words or phrases may be inadvertently transcribed.  Although I have reviewed the note for such errors, some may still exist.    Ean Manley MD  14:36 CDT  08/18/22

## 2022-08-29 ENCOUNTER — OFFICE VISIT (OUTPATIENT)
Dept: CARDIOLOGY | Facility: CLINIC | Age: 77
End: 2022-08-29

## 2022-08-29 VITALS
SYSTOLIC BLOOD PRESSURE: 164 MMHG | HEIGHT: 67 IN | DIASTOLIC BLOOD PRESSURE: 80 MMHG | BODY MASS INDEX: 21.5 KG/M2 | WEIGHT: 137 LBS | HEART RATE: 65 BPM | OXYGEN SATURATION: 98 %

## 2022-08-29 DIAGNOSIS — Z01.818 PREOPERATIVE EVALUATION TO RULE OUT SURGICAL CONTRAINDICATION: ICD-10-CM

## 2022-08-29 DIAGNOSIS — E78.2 MIXED HYPERLIPIDEMIA: ICD-10-CM

## 2022-08-29 DIAGNOSIS — I10 ESSENTIAL HYPERTENSION: ICD-10-CM

## 2022-08-29 DIAGNOSIS — I25.810 CORONARY ARTERY DISEASE INVOLVING CORONARY BYPASS GRAFT OF NATIVE HEART WITHOUT ANGINA PECTORIS: Primary | ICD-10-CM

## 2022-08-29 PROCEDURE — 93000 ELECTROCARDIOGRAM COMPLETE: CPT | Performed by: INTERNAL MEDICINE

## 2022-08-29 PROCEDURE — 99214 OFFICE O/P EST MOD 30 MIN: CPT | Performed by: INTERNAL MEDICINE

## 2022-08-29 RX ORDER — HYDROCHLOROTHIAZIDE 25 MG/1
25 TABLET ORAL DAILY
Qty: 30 TABLET | Refills: 11 | Status: SHIPPED | OUTPATIENT
Start: 2022-08-29

## 2022-08-29 RX ORDER — ALBUTEROL SULFATE 90 UG/1
AEROSOL, METERED RESPIRATORY (INHALATION)
COMMUNITY
Start: 2022-08-19

## 2022-08-29 NOTE — PROGRESS NOTES
Reason for Visit: cardiovascular follow up.    HPI:  Felipa Guerrero is a 77 y.o. female is here today for follow-up.  She has been feeling well for the most part.  She has only had rare chest pain and had to take nitroglycerin on 1 occasion.  She is getting ready to have an eye surgery done at the [facility].  She reports that this is a relatively benign procedure and does not even require any anesthesia or sedation.  Her blood pressure has been running high.    Previous Cardiac Testing and Procedures:  -VIDA (1/5/2017) no significant arterial insufficiency bilaterally  -CABG (2/2019) [facility] at [facility]  -Holter Monitor (7/17/2019) rare PAC's and PVC's with 4 runs of nonsustained PAT up to 4 beats  -Echo (6/25/2020) EF 47%, hypokinetic septum, normal valves, normal LV size  -Carotid ultrasound (5/20/2021) mild less than 50% bilaterally, antegrade flow in both vertebral arteries  -BMP (9/7/2021) creatinine 0.78, potassium 3.5, sodium 140  -Lipid panel (9/7/2021) total cholesterol 173, HDL 46, LDL 74, triglycerides 260  -Dobutamine stress echo (10/19/2021) clinically and electrically negative, normal left ventricular contractility both at rest and during dobutamine infusion  -Echo (12/27/2021) EF 61-65%, grade 1 diastolic dysfunction, normal RV size and function, no significant valve disease, normal RVSP  -LHC (1/26/2022) 80-90% stenosis of LCx with patent SVG to OM, otherwise mild nonobstructive disease with patent LIMA to LAD  -Polysomnography (4/17/2022) no evidence of sleep apnea, poor sleep efficiency, hypoxia noted    Patient Active Problem List   Diagnosis   • Essential hypertension   • Mixed hyperlipidemia   • Stroke (HCC)   • Coronary artery disease involving coronary bypass graft of native heart without angina pectoris   • Dyspnea on exertion   • Epigastric pain   • Nausea       Social History     Tobacco Use   • Smoking status: Never Smoker   • Smokeless tobacco: Never Used   Vaping Use   • Vaping  Use: Never used   Substance Use Topics   • Alcohol use: No   • Drug use: No       Family History   Problem Relation Age of Onset   • Heart disease Mother    • Cancer Mother    • Stroke Mother    • COPD Father    • Lung disease Father    • Heart disease Father    • Heart disease Sister    • Breast cancer Sister    • Diabetes Brother    • Cancer Daughter    • Hepatitis Daughter    • COPD Son    • Lung cancer Son    • Colon cancer Neg Hx    • Colon polyps Neg Hx        The following portions of the patient's history were reviewed and updated as appropriate: allergies, current medications, past family history, past medical history, past social history, past surgical history and problem list.      Current Outpatient Medications:   •  albuterol sulfate  (90 Base) MCG/ACT inhaler, , Disp: , Rfl:   •  ASPIRIN 81 PO, Take  by mouth 3 (Three) Times a Week., Disp: , Rfl:   •  BREO ELLIPTA 100-25 MCG/INH aerosol powder , INHALE 1 PUFF PO QD, Disp: , Rfl: 2  •  carvedilol (COREG) 3.125 MG tablet, Take 3.125 mg by mouth 2 (Two) Times a Day With Meals., Disp: , Rfl:   •  citalopram (CeleXA) 20 MG tablet, , Disp: , Rfl:   •  clopidogrel (PLAVIX) 75 MG tablet, Take 75 mg by mouth Daily., Disp: , Rfl:   •  diazePAM (VALIUM) 5 MG tablet, TK 1 T PO BID, Disp: , Rfl: 2  •  HYDROcodone-acetaminophen (NORCO)  MG per tablet, TK 1 T PO TID, Disp: , Rfl: 0  •  isosorbide mononitrate (IMDUR) 30 MG 24 hr tablet, Take 1 tablet by mouth Daily., Disp: 30 tablet, Rfl: 0  •  ketorolac (ACULAR) 0.5 % ophthalmic solution, INSTILL 1 DROP INTO THE SURGICAL EYE THREE TIMES DAILY. START 2 DAYS BEFORE SURGERY THEN CONTINUE THREE TIMES DAILY UNTIL GONE, Disp: , Rfl:   •  losartan (COZAAR) 100 MG tablet, Take 1 tablet by mouth Daily., Disp: 30 tablet, Rfl: 11  •  meclizine (ANTIVERT) 25 MG tablet, Take 25 mg by mouth 3 (Three) Times a Day As Needed for Dizziness., Disp: , Rfl:   •  Mirabegron ER (Myrbetriq) 25 MG tablet sustained-release 24  "hour 24 hr tablet, Take 1 tablet by mouth Daily., Disp: 30 tablet, Rfl: 11  •  ofloxacin (OCUFLOX) 0.3 % ophthalmic solution, INSTILL 1 DROP TO SURGICAL EYE THREE TIMES DAILY. START 2 DAYS BEFORE SURGERY-USE UNTIL GONE, Disp: , Rfl:   •  pantoprazole (PROTONIX) 40 MG EC tablet, Take 40 mg by mouth Daily., Disp: , Rfl:   •  prednisoLONE acetate (PRED FORTE) 1 % ophthalmic suspension, INSTILL 1 DROP INTO THE SURGICAL EYE THREE TIMES DAILY. START 2 DAYS PRIOR TO SURGERY THEN CONTINUE THREE TIMES DAILY UNTIL GONE, Disp: , Rfl:   •  rosuvastatin (CRESTOR) 20 MG tablet, Take  by mouth Daily., Disp: , Rfl: 2  •  tiZANidine (ZANAFLEX) 4 MG tablet, TK 1 T PO BID, Disp: , Rfl: 2  •  hydroCHLOROthiazide (HYDRODIURIL) 25 MG tablet, Take 1 tablet by mouth Daily., Disp: 30 tablet, Rfl: 11    Review of Systems   Constitutional: Negative for chills and fever.   Eyes: Positive for visual disturbance.   Cardiovascular: Negative for chest pain and paroxysmal nocturnal dyspnea.   Respiratory: Negative for cough and shortness of breath.    Skin: Negative for rash.   Gastrointestinal: Negative for abdominal pain and heartburn.   Neurological: Negative for dizziness and numbness.       Objective   /80 (BP Location: Left arm, Patient Position: Sitting, Cuff Size: Adult)   Pulse 65   Ht 170.2 cm (67.01\")   Wt 62.1 kg (137 lb)   SpO2 98%   BMI 21.45 kg/m²   Constitutional:       Appearance: Well-developed.   HENT:      Head: Normocephalic and atraumatic.   Pulmonary:      Effort: Pulmonary effort is normal.      Breath sounds: Normal breath sounds.   Cardiovascular:      Normal rate. Regular rhythm.      Murmurs: There is no murmur.      No gallop. No click.   Skin:     General: Skin is warm and dry.   Neurological:      Mental Status: Alert and oriented to person, place, and time.         ECG 12 Lead    Date/Time: 8/29/2022 11:42 AM  Performed by: Angelo Saleem MD  Authorized by: Angelo Saleem MD   Comparison: compared with " previous ECG from 4/19/2022  Similar to previous ECG  Rhythm: sinus bradycardia              ICD-10-CM ICD-9-CM   1. Coronary artery disease involving coronary bypass graft of native heart without angina pectoris  I25.810 414.05   2. Essential hypertension  I10 401.9   3. Mixed hyperlipidemia  E78.2 272.2   4. Preoperative evaluation to rule out surgical contraindication  Z01.818 V72.83         Assessment/Plan:  1.  Coronary artery disease: History of CABG in 2019.  LHC from 1/26/2022 showed single-vessel disease of LCx with patent bypass grafts, including SVG to OM.    Only rare chest pain.  Continue aspirin, Plavix, carvedilol, Imdur, nitroglycerin, and rosuvastatin.     2.  Essential hypertension:  Blood pressure remains elevated today.  Titrate up HCTZ to 25 mg and check BMP in 2 weeks.     3.  Mixed hyperlipidemia: Borderline control lipid panel from 9/7/2021. Continue rosuvastatin and order repeat lipid panel.     4.  Preoperative evaluation: Upcoming surgery at the [facility].  She has a low to intermediate risk surgical candidate for low risk surgical procedure.  No further cardiac testing is indicated at this time.  It is acceptable to hold Plavix for 5 to 7 days prior to surgery if necessary.  She should continue aspirin and all her other medications throughout.

## 2022-09-08 ENCOUNTER — DOCUMENTATION (OUTPATIENT)
Dept: CT IMAGING | Facility: HOSPITAL | Age: 77
End: 2022-09-08

## 2022-09-08 NOTE — PROGRESS NOTES
A notification letter was sent to the patient explaining that a recent imaging exam showed an incidental finding, for which follow-up testing was recommended.  Routed to PCP

## 2022-09-13 ENCOUNTER — HOSPITAL ENCOUNTER (OUTPATIENT)
Dept: ULTRASOUND IMAGING | Facility: HOSPITAL | Age: 77
Discharge: HOME | End: 2022-09-13
Admitting: SURGERY

## 2022-09-13 DIAGNOSIS — I65.23 BILATERAL CAROTID ARTERY STENOSIS: ICD-10-CM

## 2022-09-13 PROCEDURE — 93880 EXTRACRANIAL BILAT STUDY: CPT | Performed by: SURGERY

## 2022-09-13 PROCEDURE — 93880 EXTRACRANIAL BILAT STUDY: CPT

## 2022-09-27 ENCOUNTER — TELEPHONE (OUTPATIENT)
Dept: VASCULAR SURGERY | Facility: CLINIC | Age: 77
End: 2022-09-27

## 2022-09-28 ENCOUNTER — OFFICE VISIT (OUTPATIENT)
Dept: VASCULAR SURGERY | Facility: CLINIC | Age: 77
End: 2022-09-28

## 2022-09-28 VITALS
OXYGEN SATURATION: 98 % | BODY MASS INDEX: 21.28 KG/M2 | WEIGHT: 135.6 LBS | SYSTOLIC BLOOD PRESSURE: 128 MMHG | DIASTOLIC BLOOD PRESSURE: 70 MMHG | HEART RATE: 72 BPM | HEIGHT: 67 IN

## 2022-09-28 DIAGNOSIS — I10 PRIMARY HYPERTENSION: ICD-10-CM

## 2022-09-28 DIAGNOSIS — E78.5 HYPERLIPIDEMIA, UNSPECIFIED HYPERLIPIDEMIA TYPE: ICD-10-CM

## 2022-09-28 DIAGNOSIS — I65.23 BILATERAL CAROTID ARTERY STENOSIS: Primary | ICD-10-CM

## 2022-09-28 PROCEDURE — 99214 OFFICE O/P EST MOD 30 MIN: CPT | Performed by: NURSE PRACTITIONER

## 2022-09-28 NOTE — PROGRESS NOTES
"9/28/2022       Coleman Zapata MD  [address] [place], [place] [id number]    Felipa Guerrero  1945    Chief Complaint   Patient presents with   • Follow-up     Patient is here for 1 year follow up. Denies stroke symptoms and swelling/pain. Never been a smoker.        Dear Coleman Zapata MD   HPI  I had the pleasure of seeing your patient Felipa Guerrero in the office today.  As you recall, Felipa Guerrero is a 77 y.o.  female who you are following for routine health maintenance.  She was initially sent here after family stating she was having mini strokes with a slight left facial droop.  At that time she had an MRI showing chronic lacunar infarcts in the right and small vessel disease.  Her CTA of the neck showed significant vertebral disease bilaterally.  Currently she is doing well and denies any strokelike symptoms.  She is maintained on aspirin, Plavix, and Crestor.  She did have noninvasive testing performed today, which I did review in office.        Review of Systems   Constitutional: Negative.    HENT: Negative.    Eyes: Negative.    Respiratory: Negative.    Cardiovascular: Negative.    Gastrointestinal: Negative.    Endocrine: Negative.    Genitourinary: Negative.    Musculoskeletal: Negative.    Skin: Negative.    Allergic/Immunologic: Negative.    Neurological: Negative.         Memory problems   Hematological: Negative.    Psychiatric/Behavioral: Negative.    All other systems reviewed and are negative.    /70   Pulse 72   Ht 170.2 cm (67\")   Wt 61.5 kg (135 lb 9.6 oz)   SpO2 98%   BMI 21.24 kg/m²   Physical Exam  Vitals and nursing note reviewed.   Constitutional:       General: She is not in acute distress.     Appearance: Normal appearance. She is well-developed and normal weight. She is not diaphoretic.   HENT:      Head: Normocephalic and atraumatic.   Eyes:      General: No scleral icterus.     Pupils: Pupils are equal, round, and reactive to light.   Neck:      " Thyroid: No thyromegaly.      Vascular: No carotid bruit or JVD.   Cardiovascular:      Rate and Rhythm: Normal rate and regular rhythm.      Pulses: Normal pulses.      Heart sounds: Normal heart sounds and S2 normal. No murmur heard.    No friction rub. No gallop.   Pulmonary:      Effort: Pulmonary effort is normal.      Breath sounds: Normal breath sounds.   Abdominal:      General: Bowel sounds are normal.      Palpations: Abdomen is soft.   Musculoskeletal:         General: Normal range of motion.      Cervical back: Normal range of motion and neck supple.   Skin:     General: Skin is warm and dry.   Neurological:      General: No focal deficit present.      Mental Status: She is alert and oriented to person, place, and time.      Cranial Nerves: No cranial nerve deficit.   Psychiatric:         Mood and Affect: Mood normal.         Behavior: Behavior normal.         Thought Content: Thought content normal.         Judgment: Judgment normal.       Diagnostic data:  US Carotid Bilateral    Result Date: 9/13/2022  Narrative: History: Carotid occlusive disease      Impression: Impression: 1. There is less than 50% stenosis of the right internal carotid artery. 2. There is less than 50% stenosis of the left internal carotid artery. 3. Antegrade flow is demonstrated in bilateral vertebral arteries.  Comments: Bilateral carotid vertebral arterial duplex scan was performed.  Grayscale imaging shows intimal thickening and calcified elements at the carotid bifurcation. The right internal carotid artery peak systolic velocity is 85.4 cm/sec. The end-diastolic velocity is 20.7 cm/sec. The right ICA/CCA ratio is approximately 1.3 . These findings correlate with less than 50% stenosis of the right internal carotid artery.  Grayscale imaging shows intimal thickening and calcified elements at the carotid bifurcation. The left internal carotid artery peak systolic velocity is 83.5 cm/sec. The end-diastolic velocity is 19.1  cm/sec. The left ICA/CCA ratio is approximately 0.8 . These findings correlate with less than 50% stenosis of the left internal carotid artery.  Antegrade flow is demonstrated in bilateral vertebral arteries.  This report was finalized on 09/13/2022 15:53 by Dr. Aravind Moon MD.       Patient Active Problem List   Diagnosis   • Essential hypertension   • Mixed hyperlipidemia   • Stroke (HCC)   • Coronary artery disease involving coronary bypass graft of native heart without angina pectoris   • Dyspnea on exertion   • Epigastric pain   • Nausea        Diagnosis Plan   1. Bilateral carotid artery stenosis  US Carotid Bilateral   2. Primary hypertension     3. Hyperlipidemia, unspecified hyperlipidemia type         Plan: After thoroughly evaluating Felipa Guerrero, I believe the best course of action is to remain conservative from vascular surgery standpoint.  I did review her testing which shows less than 50% carotid stenosis bilaterally with bilateral antegrade vertebral flow.  We will see her back in 1 year with repeat noninvasive testing for continued surveillance, including a carotid duplex.  I did discuss vascular risk factors as a pertains to the progression of vascular disease including controlling her hypertension and hyperlipidemia.  Her blood pressure stable on her current medications.  She is maintained on Lipitor for hyperlipidemia. The patient is to continue taking their medications as previously discussed.   This was all discussed in full with complete understanding.  Thank you for allowing me to participate in the care of your patient.  Please do not hesitate to call with any questions or concerns.  We will keep you aware of any further encounters with Felipa Guerrero.        Sincerely yours,         MAGALIS Dietrich Jonathan E, MD

## 2022-12-28 NOTE — PROGRESS NOTES
Reason for Visit: cardiovascular follow up.    HPI:  Felipa Guerrero is a 77 y.o. female is here today for 4-month follow-up.  She follows in cardiology clinic for coronary artery disease, hypertension, and hyperlipidemia.  Her blood pressure was elevated at last visit and hydrochlorothiazide was titrated up to 25 mg.  BMP and lipid panel ordered but have not been collected.  Her blood pressure is better today.  She gets occasional chest pain and palpitations.  She gets a little out of breath when she exerts herself.  She had eye surgery several months back but her vision didn't improve much.  She is getting ready to see a podiatrist about a black area on her left big toe.      Previous Cardiac Testing and Procedures:  -VIDA (1/5/2017) no significant arterial insufficiency bilaterally  -CABG (2/2019) [place] [facility]  -Holter Monitor (7/17/2019) rare PAC's and PVC's with 4 runs of nonsustained PAT up to 4 beats  -Echo (6/25/2020) EF 47%, hypokinetic septum, normal valves, normal LV size  -Carotid ultrasound (5/20/2021) mild less than 50% bilaterally, antegrade flow in both vertebral arteries  -BMP (9/7/2021) creatinine 0.78, potassium 3.5, sodium 140  -Lipid panel (9/7/2021) total cholesterol 173, HDL 46, LDL 74, triglycerides 260  -Dobutamine stress echo (10/19/2021) clinically and electrically negative, normal left ventricular contractility both at rest and during dobutamine infusion  -Echo (12/27/2021) EF 61-65%, grade 1 diastolic dysfunction, normal RV size and function, no significant valve disease, normal RVSP  -LHC (1/26/2022) 80-90% stenosis of LCx with patent SVG to OM, otherwise mild nonobstructive disease with patent LIMA to LAD  -Polysomnography (4/17/2022) no evidence of sleep apnea, poor sleep efficiency, hypoxia noted    Patient Active Problem List   Diagnosis   • Essential hypertension   • Mixed hyperlipidemia   • Stroke (HCC)   • Coronary artery disease involving coronary bypass graft of native  heart without angina pectoris   • Dyspnea on exertion   • Epigastric pain   • Nausea       Social History     Tobacco Use   • Smoking status: Never   • Smokeless tobacco: Never   Vaping Use   • Vaping Use: Never used   Substance Use Topics   • Alcohol use: No   • Drug use: No       Family History   Problem Relation Age of Onset   • Heart disease Mother    • Cancer Mother    • Stroke Mother    • COPD Father    • Lung disease Father    • Heart disease Father    • Heart disease Sister    • Breast cancer Sister    • Diabetes Brother    • Cancer Daughter    • Hepatitis Daughter    • COPD Son    • Lung cancer Son    • Colon cancer Neg Hx    • Colon polyps Neg Hx        The following portions of the patient's history were reviewed and updated as appropriate: allergies, current medications, past family history, past medical history, past social history, past surgical history and problem list.      Current Outpatient Medications:   •  albuterol sulfate  (90 Base) MCG/ACT inhaler, , Disp: , Rfl:   •  ASPIRIN 81 PO, Take  by mouth 3 (Three) Times a Week., Disp: , Rfl:   •  BREO ELLIPTA 100-25 MCG/INH aerosol powder , INHALE 1 PUFF PO QD, Disp: , Rfl: 2  •  carvedilol (COREG) 3.125 MG tablet, Take 3.125 mg by mouth 2 (Two) Times a Day With Meals., Disp: , Rfl:   •  citalopram (CeleXA) 20 MG tablet, , Disp: , Rfl:   •  clopidogrel (PLAVIX) 75 MG tablet, Take 75 mg by mouth Daily., Disp: , Rfl:   •  diazePAM (VALIUM) 5 MG tablet, TK 1 T PO BID, Disp: , Rfl: 2  •  hydroCHLOROthiazide (HYDRODIURIL) 25 MG tablet, Take 1 tablet by mouth Daily., Disp: 30 tablet, Rfl: 11  •  HYDROcodone-acetaminophen (NORCO)  MG per tablet, TK 1 T PO TID, Disp: , Rfl: 0  •  isosorbide mononitrate (IMDUR) 30 MG 24 hr tablet, Take 1 tablet by mouth Daily., Disp: 30 tablet, Rfl: 0  •  ketorolac (ACULAR) 0.5 % ophthalmic solution, INSTILL 1 DROP INTO THE SURGICAL EYE THREE TIMES DAILY. START 2 DAYS BEFORE SURGERY THEN CONTINUE THREE TIMES  "DAILY UNTIL GONE, Disp: , Rfl:   •  losartan (COZAAR) 100 MG tablet, Take 1 tablet by mouth Daily., Disp: 30 tablet, Rfl: 11  •  meclizine (ANTIVERT) 25 MG tablet, Take 25 mg by mouth 3 (Three) Times a Day As Needed for Dizziness., Disp: , Rfl:   •  Mirabegron ER (Myrbetriq) 25 MG tablet sustained-release 24 hour 24 hr tablet, Take 1 tablet by mouth Daily., Disp: 30 tablet, Rfl: 11  •  ofloxacin (OCUFLOX) 0.3 % ophthalmic solution, INSTILL 1 DROP TO SURGICAL EYE THREE TIMES DAILY. START 2 DAYS BEFORE SURGERY-USE UNTIL GONE, Disp: , Rfl:   •  pantoprazole (PROTONIX) 40 MG EC tablet, Take 40 mg by mouth Daily., Disp: , Rfl:   •  prednisoLONE acetate (PRED FORTE) 1 % ophthalmic suspension, INSTILL 1 DROP INTO THE SURGICAL EYE THREE TIMES DAILY. START 2 DAYS PRIOR TO SURGERY THEN CONTINUE THREE TIMES DAILY UNTIL GONE, Disp: , Rfl:   •  rosuvastatin (CRESTOR) 20 MG tablet, Take  by mouth Daily., Disp: , Rfl: 2  •  tiZANidine (ZANAFLEX) 4 MG tablet, TK 1 T PO BID, Disp: , Rfl: 2    Review of Systems   Constitutional: Negative for chills and fever.   Cardiovascular: Positive for chest pain and palpitations. Negative for paroxysmal nocturnal dyspnea.   Respiratory: Negative for cough and shortness of breath.    Skin: Negative for rash.   Gastrointestinal: Negative for abdominal pain and heartburn.   Neurological: Negative for dizziness and numbness.       Objective   /68 (BP Location: Left arm, Patient Position: Sitting, Cuff Size: Adult)   Pulse 60   Ht 170.2 cm (67.01\")   Wt 61.2 kg (135 lb)   SpO2 98%   BMI 21.14 kg/m²   Constitutional:       Appearance: Well-developed and normal weight.   HENT:      Head: Normocephalic and atraumatic.   Pulmonary:      Effort: Pulmonary effort is normal.      Breath sounds: Normal breath sounds.   Cardiovascular:      Normal rate. Regular rhythm.      Murmurs: There is no murmur.      No gallop. No click.   Skin:     General: Skin is warm and dry.   Neurological:      Mental " Status: Alert and oriented to person, place, and time.       Procedures      ICD-10-CM ICD-9-CM   1. Coronary artery disease involving coronary bypass graft of native heart without angina pectoris  I25.810 414.05   2. Essential hypertension  I10 401.9   3. Mixed hyperlipidemia  E78.2 272.2         Assessment/Plan:  1.  Coronary artery disease: History of CABG in 2019.  Single-vessel disease of LCx with patent bypass grafts, including SVG to OM on [facility] 1/26/2022.  She only has rare chest pain symptoms with some atypical characteristics.  Continue aspirin, Plavix, carvedilol, Imdur, nitroglycerin, and rosuvastatin.     2.  Essential hypertension: Blood pressure is better controlled today.  Continue HCTZ, carvedilol, and losartan.  Patient getting her BMP drawn today.     3.  Mixed hyperlipidemia: Continue rosuvastatin.  Patient getting repeat lipid panel done today.

## 2022-12-30 ENCOUNTER — LAB (OUTPATIENT)
Dept: LAB | Facility: HOSPITAL | Age: 77
End: 2022-12-30
Payer: MEDICARE

## 2022-12-30 ENCOUNTER — TELEPHONE (OUTPATIENT)
Dept: CARDIOLOGY | Facility: CLINIC | Age: 77
End: 2022-12-30

## 2022-12-30 ENCOUNTER — OFFICE VISIT (OUTPATIENT)
Dept: CARDIOLOGY | Facility: CLINIC | Age: 77
End: 2022-12-30

## 2022-12-30 VITALS
HEIGHT: 67 IN | SYSTOLIC BLOOD PRESSURE: 118 MMHG | WEIGHT: 135 LBS | OXYGEN SATURATION: 98 % | HEART RATE: 60 BPM | DIASTOLIC BLOOD PRESSURE: 68 MMHG | BODY MASS INDEX: 21.19 KG/M2

## 2022-12-30 DIAGNOSIS — I10 ESSENTIAL HYPERTENSION: ICD-10-CM

## 2022-12-30 DIAGNOSIS — I25.810 CORONARY ARTERY DISEASE INVOLVING CORONARY BYPASS GRAFT OF NATIVE HEART WITHOUT ANGINA PECTORIS: ICD-10-CM

## 2022-12-30 DIAGNOSIS — E78.2 MIXED HYPERLIPIDEMIA: ICD-10-CM

## 2022-12-30 DIAGNOSIS — I25.810 CORONARY ARTERY DISEASE INVOLVING CORONARY BYPASS GRAFT OF NATIVE HEART WITHOUT ANGINA PECTORIS: Primary | ICD-10-CM

## 2022-12-30 LAB
ANION GAP SERPL CALCULATED.3IONS-SCNC: 9 MMOL/L (ref 5–15)
BUN SERPL-MCNC: 21 MG/DL (ref 8–23)
BUN/CREAT SERPL: 31.3 (ref 7–25)
CALCIUM SPEC-SCNC: 8.8 MG/DL (ref 8.6–10.5)
CHLORIDE SERPL-SCNC: 105 MMOL/L (ref 98–107)
CHOLEST SERPL-MCNC: 166 MG/DL (ref 0–200)
CO2 SERPL-SCNC: 27 MMOL/L (ref 22–29)
CREAT SERPL-MCNC: 0.67 MG/DL (ref 0.57–1)
EGFRCR SERPLBLD CKD-EPI 2021: 89.6 ML/MIN/1.73
GLUCOSE SERPL-MCNC: 99 MG/DL (ref 65–99)
HDLC SERPL-MCNC: 57 MG/DL (ref 40–60)
LDLC SERPL CALC-MCNC: 94 MG/DL (ref 0–100)
LDLC/HDLC SERPL: 1.62 {RATIO}
POTASSIUM SERPL-SCNC: 4.2 MMOL/L (ref 3.5–5.2)
SODIUM SERPL-SCNC: 141 MMOL/L (ref 136–145)
TRIGL SERPL-MCNC: 82 MG/DL (ref 0–150)
VLDLC SERPL-MCNC: 15 MG/DL (ref 5–40)

## 2022-12-30 PROCEDURE — 80048 BASIC METABOLIC PNL TOTAL CA: CPT

## 2022-12-30 PROCEDURE — 80061 LIPID PANEL: CPT

## 2022-12-30 PROCEDURE — 36415 COLL VENOUS BLD VENIPUNCTURE: CPT

## 2022-12-30 PROCEDURE — 99214 OFFICE O/P EST MOD 30 MIN: CPT | Performed by: INTERNAL MEDICINE

## 2022-12-30 NOTE — TELEPHONE ENCOUNTER
----- Message from Angelo Saleem MD sent at 12/30/2022 12:41 PM CST -----  Please let her know that her metabolic panel is within normal limits.

## 2023-03-06 DIAGNOSIS — R32 URINARY INCONTINENCE, UNSPECIFIED TYPE: ICD-10-CM

## 2023-03-06 RX ORDER — MIRABEGRON 25 MG/1
TABLET, FILM COATED, EXTENDED RELEASE ORAL
Qty: 30 TABLET | Refills: 11 | OUTPATIENT
Start: 2023-03-06

## 2023-03-09 DIAGNOSIS — R32 URINARY INCONTINENCE, UNSPECIFIED TYPE: ICD-10-CM

## 2023-03-10 ENCOUNTER — TELEPHONE (OUTPATIENT)
Dept: UROLOGY | Facility: CLINIC | Age: 78
End: 2023-03-10
Payer: COMMERCIAL

## 2023-03-10 RX ORDER — MIRABEGRON 25 MG/1
TABLET, FILM COATED, EXTENDED RELEASE ORAL
Qty: 30 TABLET | Refills: 11 | OUTPATIENT
Start: 2023-03-10

## 2023-03-10 NOTE — TELEPHONE ENCOUNTER
LVM FOR PT TO C/B AND GET APPT SCHEDULED. OK TO SCHEDULE FOR HUB    Can patient get appt for med refill  W/ELEUTERIO

## 2023-03-28 ENCOUNTER — OFFICE VISIT (OUTPATIENT)
Dept: GASTROENTEROLOGY | Facility: CLINIC | Age: 78
End: 2023-03-28
Payer: MEDICARE

## 2023-03-28 ENCOUNTER — LAB (OUTPATIENT)
Dept: LAB | Facility: HOSPITAL | Age: 78
End: 2023-03-28
Payer: MEDICARE

## 2023-03-28 VITALS
WEIGHT: 135 LBS | DIASTOLIC BLOOD PRESSURE: 80 MMHG | TEMPERATURE: 96.4 F | SYSTOLIC BLOOD PRESSURE: 166 MMHG | BODY MASS INDEX: 21.19 KG/M2 | HEIGHT: 67 IN | HEART RATE: 63 BPM | OXYGEN SATURATION: 97 %

## 2023-03-28 DIAGNOSIS — K92.1 MELENA: ICD-10-CM

## 2023-03-28 DIAGNOSIS — K92.1 MELENA: Primary | ICD-10-CM

## 2023-03-28 DIAGNOSIS — Z12.11 ENCOUNTER FOR SCREENING FOR MALIGNANT NEOPLASM OF COLON: ICD-10-CM

## 2023-03-28 DIAGNOSIS — D68.9 COAGULOPATHY: ICD-10-CM

## 2023-03-28 LAB
DEPRECATED RDW RBC AUTO: 45 FL (ref 37–54)
ERYTHROCYTE [DISTWIDTH] IN BLOOD BY AUTOMATED COUNT: 13.2 % (ref 12.3–15.4)
HCT VFR BLD AUTO: 40.2 % (ref 34–46.6)
HGB BLD-MCNC: 12.5 G/DL (ref 12–15.9)
MCH RBC QN AUTO: 28.8 PG (ref 26.6–33)
MCHC RBC AUTO-ENTMCNC: 31.1 G/DL (ref 31.5–35.7)
MCV RBC AUTO: 92.6 FL (ref 79–97)
PLATELET # BLD AUTO: 204 10*3/MM3 (ref 140–450)
PMV BLD AUTO: 11.4 FL (ref 6–12)
RBC # BLD AUTO: 4.34 10*6/MM3 (ref 3.77–5.28)
WBC NRBC COR # BLD: 6.57 10*3/MM3 (ref 3.4–10.8)

## 2023-03-28 PROCEDURE — 3079F DIAST BP 80-89 MM HG: CPT | Performed by: NURSE PRACTITIONER

## 2023-03-28 PROCEDURE — 1160F RVW MEDS BY RX/DR IN RCRD: CPT | Performed by: NURSE PRACTITIONER

## 2023-03-28 PROCEDURE — 3077F SYST BP >= 140 MM HG: CPT | Performed by: NURSE PRACTITIONER

## 2023-03-28 PROCEDURE — 85027 COMPLETE CBC AUTOMATED: CPT

## 2023-03-28 PROCEDURE — 1159F MED LIST DOCD IN RCRD: CPT | Performed by: NURSE PRACTITIONER

## 2023-03-28 PROCEDURE — 36415 COLL VENOUS BLD VENIPUNCTURE: CPT

## 2023-03-28 PROCEDURE — 99214 OFFICE O/P EST MOD 30 MIN: CPT | Performed by: NURSE PRACTITIONER

## 2023-03-28 RX ORDER — PANTOPRAZOLE SODIUM 40 MG/1
40 TABLET, DELAYED RELEASE ORAL DAILY
Qty: 30 TABLET | Refills: 11 | Status: SHIPPED | OUTPATIENT
Start: 2023-03-28

## 2023-03-28 NOTE — PROGRESS NOTES
General acute hospital GASTROENTEROLOGY - OFFICE NOTE    3/28/2023    Felipa Guerrero   1945    Primary Physician: Coleman Zapata MD    Chief Complaint   Patient presents with   • GI Problem     Black stools         HISTORY OF PRESENT ILLNESS:     Felipa Guerrero is a 77 y.o. female presents with black stool 1 mo ago. Lasted 2 days. Takes aspirin daily.   No upper abdominal pain. No weight loss. No n/v.        She has not been taking plavix.  She reports her last dose was 1 mo ago. Takes protonix daily.       UPPER GI ENDOSCOPY (06/08/2022 09:08)      No change in bowel habits. No bright red blood per rectum.   Colonoscopy 15 years ago. No colon polyps or colon cancer.   No family history of colon cancer/polyps.         Past Medical History:   Diagnosis Date   • Arthritis    • Asthma    • CAD (coronary artery disease)    • Cancer (HCC)     Female    • Cataract extraction status of eye, right 01/26/2022    cataract extration with lens inplant   • COPD (chronic obstructive pulmonary disease) (HCC)    • Coronary artery disease    • Depression    • Dyspnea on exertion    • Hearing loss    • Heart attack (HCC)    • Heart disease    • High blood pressure    • Hyperlipidemia    • Hypertension    • Hypertension    • Kidney stone    • Lung anomaly    • Stroke (HCC)        Past Surgical History:   Procedure Laterality Date   • BACK SURGERY      40 Years Ago x2    • BLADDER SURGERY     • CARDIAC CATHETERIZATION     • CARDIAC CATHETERIZATION N/A 02/01/2022    Procedure: Left Heart Cath;  Surgeon: Angelo Saleem MD;  Location: [facility] CATH INVASIVE LOCATION;  Service: Cardiology;  Laterality: N/A;   • CARDIAC SURGERY      3 years ago in [facility]    • CHOLECYSTECTOMY     • CORONARY ARTERY BYPASS GRAFT  03/2020     SON LE X1    • ENDOSCOPY N/A 06/08/2022    Procedure: ESOPHAGOGASTRODUODENOSCOPY WITH ANESTHESIA;  Surgeon: Ean Manley MD;  Location: [facility] ENDOSCOPY;  Service: Gastroenterology;   Laterality: N/A;  pre epigastric pain  post gastritis, hiatal hernia  dr gómez feliciano   • EYE SURGERY Bilateral 01/26/2022   • HYSTERECTOMY     • INCONTINENCE SURGERY      x2  Dr Lindsay   • NECK SURG[facility]      [facility]    • SKIN CANCER EXCISION     • TOE SURGERY         Outpatient Medications Marked as Taking for the 3/28/23 encounter (Office Visit) with Paulina Dugan APRN   Medication Sig Dispense Refill   • albuterol sulfate  (90 Base) MCG/ACT inhaler      • ASPIRIN 81 PO Take  by mouth 3 (Three) Times a Week.     • carvedilol (COREG) 3.125 MG tablet Take 1 tablet by mouth 2 (Two) Times a Day With Meals.     • citalopram (CeleXA) 20 MG tablet      • clopidogrel (PLAVIX) 75 MG tablet Take 1 tablet by mouth Daily.     • diazePAM (VALIUM) 5 MG tablet TK 1 T PO BID  2   • hydroCHLOROthiazide (HYDRODIURIL) 25 MG tablet Take 1 tablet by mouth Daily. 30 tablet 11   • HYDROcodone-acetaminophen (NORCO)  MG per tablet TK 1 T PO TID  0   • isosorbide mononitrate (IMDUR) 30 MG 24 hr tablet Take 1 tablet by mouth Daily. 30 tablet 0   • losartan (COZAAR) 100 MG tablet Take 1 tablet by mouth Daily. 30 tablet 11   • meclizine (ANTIVERT) 25 MG tablet Take 1 tablet by mouth 3 (Three) Times a Day As Needed for Dizziness.     • Mirabegron ER (Myrbetriq) 25 MG tablet sustained-release 24 hour 24 hr tablet Take 1 tablet by mouth Daily. 30 tablet 11   • pantoprazole (PROTONIX) 40 MG EC tablet Take 1 tablet by mouth Daily. 30 tablet 11   • rosuvastatin (CRESTOR) 20 MG tablet Take  by mouth Daily.  2   • tiZANidine (ZANAFLEX) 4 MG tablet TK 1 T PO BID  2   • [DISCONTINUED] pantoprazole (PROTONIX) 40 MG EC tablet Take 1 tablet by mouth Daily.         No Known Allergies    Social History     Socioeconomic History   • Marital status:    Tobacco Use   • Smoking status: Never   • Smokeless tobacco: Never   Vaping Use   • Vaping Use: Never used   Substance and Sexual Activity   • Alcohol use: No   • Drug  "use: No   • Sexual activity: Defer       Family History   Problem Relation Age of Onset   • Heart disease Mother    • Cancer Mother    • Stroke Mother    • COPD Father    • Lung disease Father    • Heart disease Father    • Heart disease Sister    • Breast cancer Sister    • Diabetes Brother    • Cancer Daughter    • Hepatitis Daughter    • COPD Son    • Lung cancer Son    • Colon cancer Neg Hx    • Colon polyps Neg Hx        Review of Systems   Constitutional: Negative for chills, fever and unexpected weight change.   Respiratory: Negative for shortness of breath.    Cardiovascular: Negative for chest pain.   Gastrointestinal: Negative for abdominal distention, abdominal pain, anal bleeding, blood in stool, constipation, diarrhea, nausea and vomiting.        Vitals:    03/28/23 1041   BP: 166/80   Pulse: 63   Temp: 96.4 °F (35.8 °C)   SpO2: 97%   Weight: 61.2 kg (135 lb)   Height: 170.2 cm (67\")      Body mass index is 21.14 kg/m².    Physical Exam  Vitals reviewed.   Constitutional:       General: She is not in acute distress.  Cardiovascular:      Rate and Rhythm: Normal rate and regular rhythm.      Heart sounds: Normal heart sounds.   Pulmonary:      Effort: Pulmonary effort is normal.      Breath sounds: Normal breath sounds.   Abdominal:      General: Bowel sounds are normal. There is no distension.      Palpations: Abdomen is soft.      Tenderness: There is no abdominal tenderness.   Skin:     General: Skin is warm and dry.   Neurological:      Mental Status: She is alert.         Results for orders placed or performed in visit on 12/30/22   Lipid Panel    Specimen: Blood   Result Value Ref Range    Total Cholesterol 166 0 - 200 mg/dL    Triglycerides 82 0 - 150 mg/dL    HDL Cholesterol 57 40 - 60 mg/dL    LDL Cholesterol  94 0 - 100 mg/dL    VLDL Cholesterol 15 5 - 40 mg/dL    LDL/HDL Ratio 1.62    Basic Metabolic Panel    Specimen: Blood   Result Value Ref Range    Glucose 99 65 - 99 mg/dL    BUN 21 8 - 23 " mg/dL    Creatinine 0.67 0.57 - 1.00 mg/dL    Sodium 141 136 - 145 mmol/L    Potassium 4.2 3.5 - 5.2 mmol/L    Chloride 105 98 - 107 mmol/L    CO2 27.0 22.0 - 29.0 mmol/L    Calcium 8.8 8.6 - 10.5 mg/dL    BUN/Creatinine Ratio 31.3 (H) 7.0 - 25.0    Anion Gap 9.0 5.0 - 15.0 mmol/L    eGFR 89.6 >60.0 mL/min/1.73           ASSESSMENT AND PLAN    Assessment & Plan     Diagnoses and all orders for this visit:    1. Melena (Primary)  -     Case Request; Standing  -     Case Request    2. Encounter for screening for malignant neoplasm of colon  -     Case Request; Standing  -     Case Request    3. Coagulopathy (HCC)  Comments:  plavix. pt reports has not taken in over 1 month.     Other orders  -     pantoprazole (PROTONIX) 40 MG EC tablet; Take 1 tablet by mouth Daily.  Dispense: 30 tablet; Refill: 11  -     Implement Anesthesia Orders Day of Procedure; Standing  -     Obtain Informed Consent; Standing      Melena has resolved. I recommend emergency room if worsening or severe symptoms. I recommend starting back on protonix once daily. She reports has not taken plavix in over 1 month. Recommend she discuss this with the ordering provide.  I did ask her granddaughter to check her pill box and if she is taking then we would need to send a letter ordering providing asking if could be off 7 days prior to colonoscopy test. She verbalizes understanding. I recommend egd for further eval and she is agreeable.       Schedule colonoscopy. miralax prep.             ESOPHAGOGASTRODUODENOSCOPY WITH ANESTHESIA (N/A), COLONOSCOPY WITH ANESTHESIA (N/A)  Risk, benefits, and alternatives of endoscopy were explained in full.  They understand that there is a risk of bleeding, perforation, and infection.  The risk of perforation goes up with esophageal dilation.  Other options to evaluate UGI complaints could involve barium swallow or UGI series, but these would be diagnostic tests only.  Patient was given time to ask questions.  I answered  them to their satisfaction and they are agreeable to proceedingAll risks, benefits, alternatives, and indications of colonoscopy procedure have been discussed with the patient. Risks to include perforation of the colon requiring possible surgery or colostomy, risk of bleeding from biopsies or removal of colon tissue, possibility of missing a colon polyp or cancer, or adverse drug reaction.  Benefits to include the diagnosis and management of disease of the colon and rectum. Alternatives to include barium enema, radiographic evaluation, lab testing or no intervention. Pt verbalizes understanding and agrees.              No follow-ups on file.          There are no Patient Instructions on file for this visit.      Paulina Dugan, APRN

## 2023-03-29 ENCOUNTER — TELEPHONE (OUTPATIENT)
Dept: GASTROENTEROLOGY | Facility: CLINIC | Age: 78
End: 2023-03-29
Payer: COMMERCIAL

## 2023-03-29 PROBLEM — K92.1 MELENA: Status: ACTIVE | Noted: 2023-03-29

## 2023-03-29 PROBLEM — Z12.11 ENCOUNTER FOR SCREENING FOR MALIGNANT NEOPLASM OF COLON: Status: ACTIVE | Noted: 2023-03-29

## 2023-03-29 NOTE — TELEPHONE ENCOUNTER
Her granddaughter was with her at recent appointment. Can you please contact the granddaughter and ask if Mrs. Guerrero is on plavix. Also let her know the cbc did not show any anemia so that is good. Thank you

## 2023-04-03 NOTE — PROGRESS NOTES
Subjective    Ms. Guerrero is 77 y.o. female    Chief Complaint: Urinary incontinence    History of Present Illness  Patient is a 77-year-old female with history of urinary incontinence she has had excellent response to Myrbetriq 25 mg.  She needs refills.  She denies any difficulty emptying her bladder she denies any urinary tract infections.    Patient had a CT scan done July 2022 ordered by an outside physician for nausea vomiting and weight loss.  It showed a 8 mm calcification that could be a calyceal diverticulum with calcification within it.  Also a left parapelvic cyst.    The following portions of the patient's history were reviewed and updated as appropriate: allergies, current medications, past family history, past medical history, past social history, past surgical history and problem list.    Review of Systems   Gastrointestinal: Negative.    Genitourinary: Positive for urgency.   Musculoskeletal: Negative.    All other systems reviewed and are negative.        Current Outpatient Medications:   •  albuterol sulfate  (90 Base) MCG/ACT inhaler, , Disp: , Rfl:   •  ASPIRIN 81 PO, Take  by mouth 3 (Three) Times a Week., Disp: , Rfl:   •  BREO ELLIPTA 100-25 MCG/INH aerosol powder , INHALE 1 PUFF PO QD, Disp: , Rfl: 2  •  carvedilol (COREG) 3.125 MG tablet, Take 1 tablet by mouth 2 (Two) Times a Day With Meals., Disp: , Rfl:   •  citalopram (CeleXA) 20 MG tablet, , Disp: , Rfl:   •  clopidogrel (PLAVIX) 75 MG tablet, Take 1 tablet by mouth Daily., Disp: , Rfl:   •  diazePAM (VALIUM) 5 MG tablet, TK 1 T PO BID, Disp: , Rfl: 2  •  hydroCHLOROthiazide (HYDRODIURIL) 25 MG tablet, Take 1 tablet by mouth Daily., Disp: 30 tablet, Rfl: 11  •  HYDROcodone-acetaminophen (NORCO)  MG per tablet, TK 1 T PO TID, Disp: , Rfl: 0  •  isosorbide mononitrate (IMDUR) 30 MG 24 hr tablet, Take 1 tablet by mouth Daily., Disp: 30 tablet, Rfl: 0  •  losartan (COZAAR) 100 MG tablet, Take 1 tablet by mouth Daily., Disp: 30  tablet, Rfl: 11  •  meclizine (ANTIVERT) 25 MG tablet, Take 1 tablet by mouth 3 (Three) Times a Day As Needed for Dizziness., Disp: , Rfl:   •  Mirabegron ER (Myrbetriq) 25 MG tablet sustained-release 24 hour 24 hr tablet, Take 1 tablet by mouth Daily., Disp: 30 tablet, Rfl: 11  •  pantoprazole (PROTONIX) 40 MG EC tablet, Take 1 tablet by mouth Daily., Disp: 30 tablet, Rfl: 11  •  prednisoLONE acetate (PRED FORTE) 1 % ophthalmic suspension, INSTILL 1 DROP INTO THE SURGICAL EYE THREE TIMES DAILY. START 2 DAYS PRIOR TO SURGERY THEN CONTINUE THREE TIMES DAILY UNTIL GONE, Disp: , Rfl:   •  rosuvastatin (CRESTOR) 20 MG tablet, Take  by mouth Daily., Disp: , Rfl: 2  •  tiZANidine (ZANAFLEX) 4 MG tablet, TK 1 T PO BID, Disp: , Rfl: 2  •  ketorolac (ACULAR) 0.5 % ophthalmic solution, INSTILL 1 DROP INTO THE SURGICAL EYE THREE TIMES DAILY. START 2 DAYS BEFORE SURGERY THEN CONTINUE THREE TIMES DAILY UNTIL GONE (Patient not taking: Reported on 4/4/2023), Disp: , Rfl:   •  Mirabegron ER (Myrbetriq) 25 MG tablet sustained-release 24 hour 24 hr tablet, Take 1 tablet by mouth Daily., Disp: 30 tablet, Rfl: 11  •  ofloxacin (OCUFLOX) 0.3 % ophthalmic solution, INSTILL 1 DROP TO SURGICAL EYE THREE TIMES DAILY. START 2 DAYS BEFORE SURGERY-USE UNTIL GONE (Patient not taking: Reported on 4/4/2023), Disp: , Rfl:     Past Medical History:   Diagnosis Date   • Arthritis    • Asthma    • CAD (coronary artery disease)    • Cancer     Female    • Cataract extraction status of eye, right 01/26/2022    cataract extration with lens inplant   • COPD (chronic obstructive pulmonary disease)    • Coronary artery disease    • Depression    • Dyspnea on exertion    • Hearing loss    • Heart attack    • Heart disease    • High blood pressure    • Hyperlipidemia    • Hypertension    • Hypertension    • Kidney stone    • Lung anomaly    • Stroke        Past Surgical History:   Procedure Laterality Date   • BACK SURGERY      40 Years Ago x2    • BLADDER  "SURGERY     • CARDIAC CATHETERIZATION     • CARDIAC CATHETERIZATION N/A 02/01/2022    Procedure: Left Heart Cath;  Surgeon: Angelo Saleem MD;  Location:  [facility] CATH INVASIVE LOCATION;  Service: Cardiology;  Laterality: N/A;   • CARDIAC SURGERY      3 years ago in [facility]    • CHOLECYSTECTOMY     • CORONARY ARTERY BYPASS GRAFT  03/2020    DR LANIE OLMEDO X1    • ENDOSCOPY N/A 06/08/2022    Procedure: ESOPHAGOGASTRODUODENOSCOPY WITH ANESTHESIA;  Surgeon: Ean Manley MD;  Location:  [facility] ENDOSCOPY;  Service: Gastroenterology;  Laterality: N/A;  pre epigastric pain  post gastritis, hiatal hernia  dr gómez feliciano   • EYE SURGERY Bilateral 01/26/2022   • HYSTERECTOMY     • INCONTINENCE SURGERY      x2  Dr Lindsay   • NECK SURGERY      30 years ago [facility]    • SKIN CANCER EXCISION     • TOE SURGERY         Social History     Socioeconomic History   • Marital status:    Tobacco Use   • Smoking status: Never   • Smokeless tobacco: Never   Vaping Use   • Vaping Use: Never used   Substance and Sexual Activity   • Alcohol use: No   • Drug use: No   • Sexual activity: Defer       Family History   Problem Relation Age of Onset   • Heart disease Mother    • Cancer Mother    • Stroke Mother    • COPD Father    • Lung disease Father    • Heart disease Father    • Heart disease Sister    • Breast cancer Sister    • Diabetes Brother    • Cancer Daughter    • Hepatitis Daughter    • COPD Son    • Lung cancer Son    • Colon cancer Neg Hx    • Colon polyps Neg Hx        Objective    Temp 98.1 °F (36.7 °C)   Ht 170.2 cm (67\")   Wt 59.9 kg (132 lb)   BMI 20.67 kg/m²     Physical Exam  Vitals reviewed.   Constitutional:       Appearance: Normal appearance.   HENT:      Head: Normocephalic and atraumatic.   Pulmonary:      Effort: Pulmonary effort is normal.   Skin:     General: Skin is warm and dry.   Neurological:      Mental Status: She is alert and oriented to person, place, and time. "   Psychiatric:         Mood and Affect: Mood normal.         Behavior: Behavior normal.             Results for orders placed or performed in visit on 04/04/23   POC Urinalysis Dipstick, Multipro    Specimen: Urine   Result Value Ref Range    Color Yellow Yellow, Straw, Dark Yellow, Samira    Clarity, UA Clear Clear    Glucose, UA Negative Negative mg/dL    Bilirubin Negative Negative    Ketones, UA Negative Negative    Specific Gravity  1.020 1.005 - 1.030    Blood, UA Negative Negative    pH, Urine 7.0     Protein, POC Negative Negative mg/dL    Urobilinogen, UA 0.2 E.U./dL Normal, 0.2 E.U./dL    Nitrite, UA Negative Negative    Leukocytes Small (1+) (A) Negative         Assessment and Plan    Diagnoses and all orders for this visit:    1. Urinary incontinence, unspecified type (Primary)  -     POC Urinalysis Dipstick, Multipro  -     Mirabegron ER (Myrbetriq) 25 MG tablet sustained-release 24 hour 24 hr tablet; Take 1 tablet by mouth Daily.  Dispense: 30 tablet; Refill: 11    Patient has had excellent response to Myrbetriq 25 mg which she needed refills today.  Urine is negative for infection and blood.  She asked about a tumor on her kidney but I looked at the CT scan it shows a calyceal diverticulum with a internal calcification.  Which has been present previously also the left parapelvic cyst but no solid mass.  She will follow-up in 1 year.

## 2023-04-04 ENCOUNTER — OFFICE VISIT (OUTPATIENT)
Dept: UROLOGY | Facility: CLINIC | Age: 78
End: 2023-04-04
Payer: MEDICARE

## 2023-04-04 VITALS — TEMPERATURE: 98.1 F | BODY MASS INDEX: 20.72 KG/M2 | HEIGHT: 67 IN | WEIGHT: 132 LBS

## 2023-04-04 DIAGNOSIS — R32 URINARY INCONTINENCE, UNSPECIFIED TYPE: Primary | ICD-10-CM

## 2023-04-04 LAB
BILIRUB BLD-MCNC: NEGATIVE MG/DL
CLARITY, POC: CLEAR
COLOR UR: YELLOW
GLUCOSE UR STRIP-MCNC: NEGATIVE MG/DL
KETONES UR QL: NEGATIVE
LEUKOCYTE EST, POC: ABNORMAL
NITRITE UR-MCNC: NEGATIVE MG/ML
PH UR: 7 [PH]
PROT UR STRIP-MCNC: NEGATIVE MG/DL
RBC # UR STRIP: NEGATIVE /UL
SP GR UR: 1.02 (ref 1–1.03)
UROBILINOGEN UR QL: ABNORMAL

## 2023-04-04 PROCEDURE — 81001 URINALYSIS AUTO W/SCOPE: CPT | Performed by: PHYSICIAN ASSISTANT

## 2023-04-04 PROCEDURE — 99214 OFFICE O/P EST MOD 30 MIN: CPT | Performed by: PHYSICIAN ASSISTANT

## 2023-04-10 ENCOUNTER — PREP FOR SURGERY (OUTPATIENT)
Dept: OTHER | Facility: HOSPITAL | Age: 78
End: 2023-04-10
Payer: MEDICARE

## 2023-04-10 ENCOUNTER — TELEPHONE (OUTPATIENT)
Dept: GASTROENTEROLOGY | Facility: CLINIC | Age: 78
End: 2023-04-10

## 2023-04-10 NOTE — TELEPHONE ENCOUNTER
Please call and schedule egd and colonoscopy , miralax prep. You will need to talk to her granddaughter ( Rylan) . Thank you

## 2023-05-08 ENCOUNTER — HOSPITAL ENCOUNTER (OUTPATIENT)
Facility: HOSPITAL | Age: 78
Setting detail: HOSPITAL OUTPATIENT SURGERY
Discharge: HOME | End: 2023-05-08
Attending: INTERNAL MEDICINE | Admitting: INTERNAL MEDICINE
Payer: MEDICARE

## 2023-05-08 ENCOUNTER — ANESTHESIA (OUTPATIENT)
Dept: GASTROENTEROLOGY | Facility: HOSPITAL | Age: 78
End: 2023-05-08
Payer: MEDICARE

## 2023-05-08 ENCOUNTER — ANESTHESIA EVENT (OUTPATIENT)
Dept: GASTROENTEROLOGY | Facility: HOSPITAL | Age: 78
End: 2023-05-08
Payer: MEDICARE

## 2023-05-08 VITALS
SYSTOLIC BLOOD PRESSURE: 158 MMHG | TEMPERATURE: 98 F | WEIGHT: 132 LBS | DIASTOLIC BLOOD PRESSURE: 61 MMHG | OXYGEN SATURATION: 100 % | HEART RATE: 67 BPM | RESPIRATION RATE: 18 BRPM | BODY MASS INDEX: 20.72 KG/M2 | HEIGHT: 67 IN

## 2023-05-08 PROCEDURE — G0121 COLON CA SCRN NOT HI RSK IND: HCPCS | Performed by: INTERNAL MEDICINE

## 2023-05-08 PROCEDURE — 25010000002 PROPOFOL 10 MG/ML EMULSION: Performed by: NURSE ANESTHETIST, CERTIFIED REGISTERED

## 2023-05-08 PROCEDURE — 43235 EGD DIAGNOSTIC BRUSH WASH: CPT | Performed by: INTERNAL MEDICINE

## 2023-05-08 RX ORDER — SODIUM CHLORIDE 0.9 % (FLUSH) 0.9 %
10 SYRINGE (ML) INJECTION AS NEEDED
Status: DISCONTINUED | OUTPATIENT
Start: 2023-05-08 | End: 2023-05-08 | Stop reason: HOSPADM

## 2023-05-08 RX ORDER — LIDOCAINE HYDROCHLORIDE 10 MG/ML
0.5 INJECTION, SOLUTION EPIDURAL; INFILTRATION; INTRACAUDAL; PERINEURAL ONCE AS NEEDED
Status: CANCELLED | OUTPATIENT
Start: 2023-05-08

## 2023-05-08 RX ORDER — SODIUM CHLORIDE 9 MG/ML
40 INJECTION, SOLUTION INTRAVENOUS AS NEEDED
Status: CANCELLED | OUTPATIENT
Start: 2023-05-08

## 2023-05-08 RX ORDER — SODIUM CHLORIDE 0.9 % (FLUSH) 0.9 %
10 SYRINGE (ML) INJECTION EVERY 12 HOURS SCHEDULED
Status: CANCELLED | OUTPATIENT
Start: 2023-05-08

## 2023-05-08 RX ORDER — SODIUM CHLORIDE 0.9 % (FLUSH) 0.9 %
10 SYRINGE (ML) INJECTION AS NEEDED
Status: CANCELLED | OUTPATIENT
Start: 2023-05-08

## 2023-05-08 RX ORDER — SODIUM CHLORIDE 9 MG/ML
500 INJECTION, SOLUTION INTRAVENOUS CONTINUOUS PRN
Status: DISCONTINUED | OUTPATIENT
Start: 2023-05-08 | End: 2023-05-08 | Stop reason: HOSPADM

## 2023-05-08 RX ORDER — LIDOCAINE HYDROCHLORIDE 20 MG/ML
INJECTION, SOLUTION EPIDURAL; INFILTRATION; INTRACAUDAL; PERINEURAL AS NEEDED
Status: DISCONTINUED | OUTPATIENT
Start: 2023-05-08 | End: 2023-05-08 | Stop reason: SURG

## 2023-05-08 RX ORDER — ONDANSETRON 2 MG/ML
4 INJECTION INTRAMUSCULAR; INTRAVENOUS ONCE AS NEEDED
Status: DISCONTINUED | OUTPATIENT
Start: 2023-05-08 | End: 2023-05-08 | Stop reason: HOSPADM

## 2023-05-08 RX ORDER — PROPOFOL 10 MG/ML
VIAL (ML) INTRAVENOUS AS NEEDED
Status: DISCONTINUED | OUTPATIENT
Start: 2023-05-08 | End: 2023-05-08 | Stop reason: SURG

## 2023-05-08 RX ORDER — SODIUM CHLORIDE 9 MG/ML
100 INJECTION, SOLUTION INTRAVENOUS CONTINUOUS
Status: CANCELLED | OUTPATIENT
Start: 2023-05-08

## 2023-05-08 RX ADMIN — LIDOCAINE HYDROCHLORIDE 100 MG: 20 INJECTION, SOLUTION EPIDURAL; INFILTRATION; INTRACAUDAL; PERINEURAL at 12:32

## 2023-05-08 RX ADMIN — PROPOFOL INJECTABLE EMULSION 180 MG: 10 INJECTION, EMULSION INTRAVENOUS at 12:32

## 2023-05-08 RX ADMIN — SODIUM CHLORIDE 500 ML: 9 INJECTION, SOLUTION INTRAVENOUS at 11:05

## 2023-05-08 NOTE — H&P
[facility]  Pre Procedure History & Physical    Chief Complaint:   Screening    Subjective     HPI:   Screening she also presents for endoscopy exam.  She had episode of melena back in February.  None since then.    Past Medical History:   Past Medical History:   Diagnosis Date   • Arthritis    • Asthma    • CAD (coronary artery disease)    • Cancer     Female    • Cataract extraction status of eye, right 01/26/2022    cataract extration with lens inplant   • COPD (chronic obstructive pulmonary disease)    • Coronary artery disease    • Depression    • Dyspnea on exertion    • GERD (gastroesophageal reflux disease)    • Hearing loss    • Heart attack    • Heart disease    • High blood pressure    • Hyperlipidemia    • Hypertension    • Hypertension    • Kidney stone    • Lung anomaly    • Stroke        Past Surgical History:  Past Surgical History:   Procedure Laterality Date   • BACK SURGERY      40 Years Ago x2    • BLADDER SURGERY     • CARDIAC CATHETERIZATION     • CARDIAC CATHETERIZATION N/A 02/01/2022    Procedure: Left Heart Cath;  Surgeon: Angelo Saleem MD;  Location: [facility] CATH INVASIVE LOCATION;  Service: Cardiology;  Laterality: N/A;   • CARDIAC SURGERY      3 years ago in [facility]    • CHOLECYSTECTOMY     • CORONARY ARTERY BYPASS GRAFT  03/2020    DR LANIE OLMEDO X1    • ENDOSCOPY N/A 06/08/2022    Procedure: ESOPHAGOGASTRODUODENOSCOPY WITH ANESTHESIA;  Surgeon: Ean Manley MD;  Location: [facility] ENDOSCOPY;  Service: Gastroenterology;  Laterality: N/A;  pre epigastric pain  post gastritis, hiatal hernia  dr gómez feliciano   • EYE SURGERY Bilateral 01/26/2022   • HYSTERECTOMY     • INCONTINENCE SURGERY      x2  Dr Lindsay   • NECK SURGERY      30 years ago [facility]    • SKIN CANCER EXCISION     • TOE SURGERY         Family History:  Family History   Problem Relation Age of Onset   • Heart disease Mother    • Cancer Mother    • Stroke Mother    • COPD  Father    • Lung disease Father    • Heart disease Father    • Heart disease Sister    • Breast cancer Sister    • Diabetes Brother    • Cancer Daughter    • Hepatitis Daughter    • COPD Son    • Lung cancer Son    • Colon cancer Neg Hx    • Colon polyps Neg Hx        Social History:   reports that she has never smoked. She has never used smokeless tobacco. She reports that she does not drink alcohol and does not use drugs.    Medications:   Prior to Admission medications    Medication Sig Start Date End Date Taking? Authorizing Provider   isosorbide mononitrate (IMDUR) 30 MG 24 hr tablet Take 1 tablet by mouth Daily. 4/19/22  Yes Teagan Santos APRN   albuterol sulfate  (90 Base) MCG/ACT inhaler  8/19/22   Marbella Castillo MD   ASPIRIN 81 PO Take  by mouth 3 (Three) Times a Week.    Marbella Castillo MD BREO ELLIPTA 100-25 MCG/INH aerosol powder  INHALE 1 PUFF PO QD 3/3/17   Marbella Castillo MD   carvedilol (COREG) 3.125 MG tablet Take 1 tablet by mouth 2 (Two) Times a Day With Meals.    Marbella Castillo MD   citalopram (CeleXA) 20 MG tablet  6/5/17   Marbella Castillo MD   clopidogrel (PLAVIX) 75 MG tablet Take 1 tablet by mouth Daily.    Marbella Castillo MD   diazePAM (VALIUM) 5 MG tablet TK 1 T PO BID 6/3/17   Marbella Castillo MD   hydroCHLOROthiazide (HYDRODIURIL) 25 MG tablet Take 1 tablet by mouth Daily. 8/29/22   Angelo Saleem MD   HYDROcodone-acetaminophen (NORCO)  MG per tablet TK 1 T PO TID 6/3/17   Marbella Castillo MD   ketorolac (ACULAR) 0.5 % ophthalmic solution INSTILL 1 DROP INTO THE SURGICAL EYE THREE TIMES DAILY. START 2 DAYS BEFORE SURGERY THEN CONTINUE THREE TIMES DAILY UNTIL GONE  Patient not taking: Reported on 4/4/2023 4/11/22   Marbella Castillo MD   losartan (COZAAR) 100 MG tablet Take 1 tablet by mouth Daily. 4/22/22   Angelo Saleem MD   meclizine (ANTIVERT) 25 MG tablet Take 1 tablet by mouth 3 (Three) Times a Day As  "Needed for Dizziness.    Marbella Castillo MD   Mirabegron ER (Myrbetriq) 25 MG tablet sustained-release 24 hour 24 hr tablet Take 1 tablet by mouth Daily. 11/29/21   Angelo Medellin PA   Mirabegron ER (Myrbetriq) 25 MG tablet sustained-release 24 hour 24 hr tablet Take 1 tablet by mouth Daily. 4/4/23   Angelo Medellin PA   ofloxacin (OCUFLOX) 0.3 % ophthalmic solution INSTILL 1 DROP TO SURGICAL EYE THREE TIMES DAILY. START 2 DAYS BEFORE SURGERY-USE UNTIL GONE  Patient not taking: Reported on 4/4/2023 4/11/22   Marbella Castillo MD   pantoprazole (PROTONIX) 40 MG EC tablet Take 1 tablet by mouth Daily. 3/28/23   Paulina Dugan APRN   prednisoLONE acetate (PRED FORTE) 1 % ophthalmic suspension INSTILL 1 DROP INTO THE SURGICAL EYE THREE TIMES DAILY. START 2 DAYS PRIOR TO SURGERY THEN CONTINUE THREE TIMES DAILY UNTIL GONE 4/11/22   Marbella Castillo MD   rosuvastatin (CRESTOR) 20 MG tablet Take  by mouth Daily. 3/25/18   Marbella Castillo MD   tiZANidine (ZANAFLEX) 4 MG tablet TK 1 T PO BID 3/28/17   Marbella Castillo MD       Allergies:  Patient has no known allergies.    ROS:    General: Weight stable  Resp: No SOA  Cardiovascular: No CP    Objective     Blood pressure 159/74, pulse 94, temperature 98 °F (36.7 °C), temperature source Temporal, resp. rate 20, height 170.2 cm (67\"), weight 59.9 kg (132 lb), SpO2 98 %, not currently breastfeeding.    Physical Exam   Constitutional: Pt is oriented to person, place, and in no distress.   Cardiovascular: Normal rate, regular rhythm.    Pulmonary/Chest: Effort normal. No respiratory distress.   Abdominal: Non-distended.  Psychiatric: Mood, memory, affect and judgment appear normal.     Assessment & Plan     Diagnosis:  Screening  History of melena  Anticipated Surgical Procedure:  Colonoscopy  Endoscopy    The risks, benefits, and alternatives of this procedure have been discussed with the patient or the responsible party- the patient understands " and agrees to proceed.    EMR Dragon/transcription disclaimer:  Much of this encounter note is electronic transcription/translation of spoken language to printed text.  The electronic translation of spoken language may be erroneous, or at times, nonsensical words or phrases may be inadvertently transcribed.  Although I have reviewed the note for such errors, some may still exist.

## 2023-05-08 NOTE — ANESTHESIA POSTPROCEDURE EVALUATION
Patient: Felipa Guerrero    Procedure Summary     Date: 05/08/23 Room / Location: [facility] ENDOSCOPY 5 / [facility] ENDOSCOPY    Anesthesia Start: 1224 Anesthesia Stop: 1300    Procedures:       ESOPHAGOGASTRODUODENOSCOPY WITH ANESTHESIA      COLONOSCOPY WITH ANESTHESIA Diagnosis:       Melena      Encounter for screening for malignant neoplasm of colon      (Melena [K92.1])      (Encounter for screening for malignant neoplasm of colon [Z12.11])    Surgeons: Ean Manley MD Provider: Althea Laguna CRNA    Anesthesia Type: MAC ASA Status: 3          Anesthesia Type: MAC    Vitals  Vitals Value Taken Time   /62 05/08/23 1301   Temp     Pulse 71 05/08/23 1303   Resp 18 05/08/23 1300   SpO2 99 % 05/08/23 1303   Vitals shown include unvalidated device data.        Post Anesthesia Care and Evaluation    Patient location during evaluation: PHASE II  Patient participation: complete - patient participated  Level of consciousness: awake and alert  Pain management: adequate    Airway patency: patent  Anesthetic complications: No anesthetic complications  PONV Status: none  Cardiovascular status: acceptable  Respiratory status: acceptable  Hydration status: acceptable

## 2023-05-08 NOTE — ANESTHESIA PREPROCEDURE EVALUATION
Anesthesia Evaluation     Patient summary reviewed   history of anesthetic complications: PONV  NPO Solid Status: > 8 hours             Airway   Mallampati: II  TM distance: >3 FB  Neck ROM: full  No difficulty expected  Dental    (+) poor dentition    Pulmonary    (+) COPD, asthma,  Cardiovascular   Exercise tolerance: good (4-7 METS)    (+) hypertension, past MI , CAD, CABG, hyperlipidemia,       Neuro/Psych  (+) CVA,    (-) seizures, TIA  GI/Hepatic/Renal/Endo    (+)   renal disease stones,   (-) liver disease, diabetes    Musculoskeletal     Abdominal    Substance History      OB/GYN          Other                          Anesthesia Plan    ASA 3     MAC     intravenous induction     Anesthetic plan, risks, benefits, and alternatives have been provided, discussed and informed consent has been obtained with: patient.        CODE STATUS:

## 2023-05-16 ENCOUNTER — TELEPHONE (OUTPATIENT)
Dept: CARDIOLOGY | Facility: CLINIC | Age: 78
End: 2023-05-16
Payer: MEDICARE

## 2023-05-16 NOTE — TELEPHONE ENCOUNTER
----- Message from Angelo Saleem MD sent at 5/16/2023  8:33 AM CDT -----  It is acceptable for patient to hold Plavix for 5 to 7 days prior to surgery and resume when safe afterwards.  She must remain on aspirin uninterrupted.  No additional cardiac testing is indicated prior to surgery.  ----- Message -----  From: Silvia Mendoza MA  Sent: 5/12/2023   3:35 PM CDT  To: Angelo Saleem MD    CARDIAC RISK ASSESSMENT FROM [facility] DERMATOLOGY AND PLASTIC SURGERY

## 2023-06-16 ENCOUNTER — DOCUMENTATION (OUTPATIENT)
Dept: NEUROLOGY | Facility: CLINIC | Age: 78
End: 2023-06-16
Payer: MEDICARE

## 2023-07-17 PROBLEM — R11.0 NAUSEA: Status: RESOLVED | Noted: 2022-08-18 | Resolved: 2023-07-17

## 2023-07-17 PROBLEM — K92.1 MELENA: Status: RESOLVED | Noted: 2023-03-29 | Resolved: 2023-07-17

## 2023-07-17 PROBLEM — R10.13 EPIGASTRIC PAIN: Status: RESOLVED | Noted: 2022-05-25 | Resolved: 2023-07-17

## 2023-07-17 PROBLEM — R06.09 DYSPNEA ON EXERTION: Status: RESOLVED | Noted: 2022-01-26 | Resolved: 2023-07-17

## 2023-07-24 RX ORDER — LOSARTAN POTASSIUM 100 MG/1
100 TABLET ORAL DAILY
Qty: 90 TABLET | Refills: 3 | Status: SHIPPED | OUTPATIENT
Start: 2023-07-24

## 2023-07-24 RX ORDER — LOSARTAN POTASSIUM 100 MG/1
100 TABLET ORAL DAILY
Qty: 30 TABLET | Refills: 11 | OUTPATIENT
Start: 2023-07-24

## 2023-09-11 ENCOUNTER — TELEPHONE (OUTPATIENT)
Dept: VASCULAR SURGERY | Facility: CLINIC | Age: 78
End: 2023-09-11
Payer: MEDICARE

## 2023-09-11 NOTE — TELEPHONE ENCOUNTER
I tried calling patient to inform that we got a call from [facility] saying that their insurance was out of network and if they kept their appt it would be billed to them. The first number did not ring and the second one had kept ringing for 2 plus mins.

## 2023-11-07 RX ORDER — HYDROCHLOROTHIAZIDE 25 MG/1
25 TABLET ORAL DAILY
Qty: 30 TABLET | Refills: 11 | Status: SHIPPED | OUTPATIENT
Start: 2023-11-07

## 2025-01-10 RX ORDER — LOSARTAN POTASSIUM 100 MG/1
100 TABLET ORAL DAILY
Qty: 30 TABLET | Refills: 0 | Status: SHIPPED | OUTPATIENT
Start: 2025-01-10

## 2025-01-13 RX ORDER — LOSARTAN POTASSIUM 100 MG/1
100 TABLET ORAL DAILY
Qty: 1 TABLET | Refills: 0 | OUTPATIENT
Start: 2025-01-13

## 2025-02-17 RX ORDER — LOSARTAN POTASSIUM 100 MG/1
100 TABLET ORAL DAILY
Qty: 1 TABLET | Refills: 0 | OUTPATIENT
Start: 2025-02-17

## 2025-04-09 RX ORDER — HYDROCHLOROTHIAZIDE 25 MG/1
25 TABLET ORAL DAILY
OUTPATIENT
Start: 2025-04-09

## 2025-04-09 RX ORDER — HYDROCHLOROTHIAZIDE 25 MG/1
25 TABLET ORAL DAILY
Qty: 30 TABLET | Refills: 11 | OUTPATIENT
Start: 2025-04-09

## 2025-04-09 NOTE — TELEPHONE ENCOUNTER
Placed call to pharmacy as this patient is following with [facility]. Jayro will remove Dr Saleem from medication refill requests with [facility].

## (undated) DEVICE — SOLIDIFIER LIQUI LOC PLUS 2000CC

## (undated) DEVICE — CANN NASL ETCO2 LO/FLO A/

## (undated) DEVICE — SENSR O2 OXIMAX FNGR A/ 18IN NONSTR

## (undated) DEVICE — CONMED SCOPE SAVER BITE BLOCK, 20X27 MM: Brand: SCOPE SAVER

## (undated) DEVICE — Device: Brand: DEFENDO AIR/WATER/SUCTION AND BIOPSY VALVE

## (undated) DEVICE — YANKAUER,BULB TIP WITH VENT: Brand: ARGYLE

## (undated) DEVICE — SOL IRR NACL 0.9PCT BT 1000ML

## (undated) DEVICE — PINNACLE INTRODUCER SHEATH: Brand: PINNACLE

## (undated) DEVICE — MASK,OXYGEN,MED CONC,ADLT,7' TUB, UC: Brand: PENDING

## (undated) DEVICE — CUFF,BP,DISP,1 TUBE,ADULT,HP: Brand: MEDLINE

## (undated) DEVICE — FRCP BIOP ENDO CAPTURAPRO SPK SERR 2.8MM 230CM

## (undated) DEVICE — FR5 INFINITI MULTIPAC: Brand: INFINITI

## (undated) DEVICE — PAD, DEFIB, ADULT, RADIOTRANS, PHILIPS: Brand: MEDLINE

## (undated) DEVICE — THE CHANNEL CLEANING BRUSH IS A NYLON FLEXI BRUSH ATTACHED TO A FLEXIBLE PLASTIC SHEATH DESIGNED TO SAFELY REMOVE DEBRIS FROM FLEXIBLE ENDOSCOPES.

## (undated) DEVICE — GW STARTER FXD CORE J .035 3X150CM 3MM

## (undated) DEVICE — PK CATH CARD 30 CA/4

## (undated) DEVICE — MODEL AT P65, P/N 701554-001KIT CONTENTS: HAND CONTROLLER, 3-WAY HIGH-PRESSURE STOPCOCK WITH ROTATING END AND PREMIUM HIGH-PRESSURE TUBING: Brand: ANGIOTOUCH® KIT

## (undated) DEVICE — MODEL BT2000 P/N 700287-012KIT CONTENTS: MANIFOLD WITH SALINE AND CONTRAST PORTS, SALINE TUBING WITH SPIKE AND HAND SYRINGE, TRANSDUCER: Brand: BT2000 AUTOMATED MANIFOLD KIT

## (undated) DEVICE — TBG SMPL FLTR LINE NASL 02/C02 A/ BX/100